# Patient Record
Sex: FEMALE | Race: WHITE | NOT HISPANIC OR LATINO | Employment: FULL TIME | URBAN - METROPOLITAN AREA
[De-identification: names, ages, dates, MRNs, and addresses within clinical notes are randomized per-mention and may not be internally consistent; named-entity substitution may affect disease eponyms.]

---

## 2019-10-18 ENCOUNTER — APPOINTMENT (EMERGENCY)
Dept: RADIOLOGY | Facility: HOSPITAL | Age: 34
End: 2019-10-18
Attending: EMERGENCY MEDICINE
Payer: COMMERCIAL

## 2019-10-18 ENCOUNTER — HOSPITAL ENCOUNTER (EMERGENCY)
Facility: HOSPITAL | Age: 34
Discharge: HOME | End: 2019-10-18
Attending: EMERGENCY MEDICINE
Payer: COMMERCIAL

## 2019-10-18 VITALS
WEIGHT: 120 LBS | HEIGHT: 65 IN | TEMPERATURE: 97.8 F | HEART RATE: 78 BPM | BODY MASS INDEX: 19.99 KG/M2 | DIASTOLIC BLOOD PRESSURE: 70 MMHG | RESPIRATION RATE: 19 BRPM | OXYGEN SATURATION: 100 % | SYSTOLIC BLOOD PRESSURE: 119 MMHG

## 2019-10-18 DIAGNOSIS — S52.502A CLOSED FRACTURE OF DISTAL END OF LEFT RADIUS, UNSPECIFIED FRACTURE MORPHOLOGY, INITIAL ENCOUNTER: Primary | ICD-10-CM

## 2019-10-18 PROCEDURE — 2W3DX1Z IMMOBILIZATION OF LEFT LOWER ARM USING SPLINT: ICD-10-PCS | Performed by: EMERGENCY MEDICINE

## 2019-10-18 PROCEDURE — 73110 X-RAY EXAM OF WRIST: CPT | Mod: LT

## 2019-10-18 PROCEDURE — 29125 APPL SHORT ARM SPLINT STATIC: CPT | Mod: LT | Performed by: PHYSICIAN ASSISTANT

## 2019-10-18 PROCEDURE — 99283 EMERGENCY DEPT VISIT LOW MDM: CPT | Mod: 25

## 2019-10-18 ASSESSMENT — ENCOUNTER SYMPTOMS
NAUSEA: 0
ABDOMINAL PAIN: 0
SHORTNESS OF BREATH: 0
WOUND: 0
WEAKNESS: 0
NUMBNESS: 0

## 2019-10-19 NOTE — ED PROVIDER NOTES
"HPI     Chief Complaint   Patient presents with   • Upper Extremity Issue       Pt is a 33 yo F with no significant past medical history presenting with chief complaint of left wrist injury. Pt reports 1 hour ago she was in her kitchen, while her son was lying on a blanket with his toys. She accidentally tripped over one of the toys causing her to fall on her buttock and sustain a FOOSH injury to her left wrist. Pt did not lose consciousness, got up without difficulty. She reports only pain to her left wrist. She is right handed. She took 800 mg motrin prior to arrival and reports pain is improving.      History provided by:  Patient       Patient History     History reviewed. No pertinent past medical history.    No past surgical history on file.    History reviewed. No pertinent family history.    Social History     Tobacco Use   • Smoking status: Never Smoker   • Smokeless tobacco: Never Used   Substance Use Topics   • Alcohol use: Yes     Comment: rare    • Drug use: Never       Systems Reviewed from Nursing Triage:          Review of Systems     Review of Systems   Respiratory: Negative for shortness of breath.    Cardiovascular: Negative for chest pain.   Gastrointestinal: Negative for abdominal pain and nausea.   Musculoskeletal: Negative for gait problem.   Skin: Negative for wound.   Neurological: Negative for weakness and numbness.        Physical Exam     ED Triage Vitals [10/18/19 2116]   Temp Heart Rate Resp BP SpO2   36.6 °C (97.8 °F) 78 19 119/70 100 %      Temp src Heart Rate Source Patient Position BP Location FiO2 (%) (Set)   -- -- -- -- --                     Patient Vitals for the past 24 hrs:   BP Temp Pulse Resp SpO2 Height Weight   10/18/19 2116 119/70 36.6 °C (97.8 °F) 78 19 100 % 1.651 m (5' 5\") 54.4 kg (120 lb)                                       Physical Exam   Constitutional: She is oriented to person, place, and time. She appears well-developed and well-nourished.  Non-toxic " appearance.   HENT:   Head: Normocephalic and atraumatic.   Eyes: Pupils are equal, round, and reactive to light. Conjunctivae and EOM are normal.   Cardiovascular:   Pulses:       Radial pulses are 2+ on the right side.   Musculoskeletal:        Left shoulder: She exhibits normal range of motion, no bony tenderness and no deformity.        Left elbow: She exhibits normal range of motion and no deformity. No tenderness found.        Left wrist: She exhibits decreased range of motion (mildly secondary to pain) and bony tenderness (at distal radius). She exhibits no tenderness (snuff box), no swelling, no deformity and no laceration.        Left hand: She exhibits normal range of motion and normal capillary refill. Normal sensation noted. Decreased sensation is not present in the ulnar distribution, is not present in the medial redistribution and is not present in the radial distribution.   Neurological: She is alert and oriented to person, place, and time.   Skin: Skin is warm and dry.   Nursing note and vitals reviewed.           Splint Application  Date/Time: 10/18/2019 10:33 PM  Performed by: Theresa Castillo PA C  Authorized by: Todd Thakur MD     Consent:     Consent obtained:  Verbal    Consent given by:  Patient    Risks discussed:  Nerve damage, pain and vascular damage  Injury:     Injury location:  Wrist    Wrist injury location:  L wrist    Wrist fracture type comment:  Distal radius  Pre-procedure assessment:     Neurological function: normal      Distal perfusion: normal      Range of motion: reduced      Range of motion comment:  Secondary to pain  Anesthesia (see MAR for exact dosages):     Anesthesia method:  None  Procedure details:     Manipulation performed: no      Immobilization:  Splint    Splint type:  Thumb spica    Supplies used:  Ortho-Glass and cotton padding (ACE)  Post-procedure assessment:     Neurological function: normal      Distal perfusion: normal      Range of motion comment:   Reduced secondary to immobilization    Patient tolerance of procedure:  Tolerated well, no immediate complications        ED Course & [facility]     Labs Reviewed - No data to display    X-RAY WRIST LEFT 3+ VIEWS   ED Interpretation   Distal radius frx   As interpreted by me, Todd Thakur M.D.   Please refer to final result as interpreted by radiologist for complete detailed description/findings of the study.                  [facility]         ED Course as of Oct 18 2236   Fri Oct 18, 2019   2209 X-ray reviewed with Dr. Thakur, Concern for fracture on distal radius, will put in thumb spica. D/c with ortho f/u within 1 week. Discussed reasons to return.    [KM]      ED Course User Index  [KM] Theresa Castillo PA C         Clinical Impressions as of Oct 18 2236   Closed fracture of distal end of left radius, unspecified fracture morphology, initial encounter      Dispo:  Discharge     Theresa Castillo PA C  10/18/19 2234       Theresa Castillo PA C  10/18/19 2236

## 2019-10-19 NOTE — ED ATTESTATION NOTE
I have personally seen, evaluated,and participated in the management of Joy Tai.  I reviewed and agree with the PA/NP's assessment and plan of care with the following exceptions: None    My examination, assessment, and plan of care for Joy Tai:  34 rt handed female p/w left wrist pain after mechanical slip and fall backwards  Exam:  Tender ecchymotic swelling to distal radiua/snuff box.  Intact distal cap refills, nv exams.  nontender hand, elbow, shoulder  Plan:  xr     Todd Thakur MD  10/18/19 0124

## 2019-10-19 NOTE — DISCHARGE INSTRUCTIONS
Keep the splint on. Do not get it wet.  Elevate & ice the arm to decrease swelling.    Motrin 600 -800 mg with food every 6-8 hours as needed for pain.    Call the orthopedist to schedule follow-up within 1 week.    Return to the ER IMMEDIATELY if you experience worsening of symptoms/ uncontrollable pain, numbness/tingling/ decreased range of motion or color change to your fingers, or develop any other new concerns.

## 2019-11-16 ENCOUNTER — HOSPITAL ENCOUNTER (EMERGENCY)
Facility: HOSPITAL | Age: 34
Discharge: HOME | End: 2019-11-16
Attending: EMERGENCY MEDICINE
Payer: COMMERCIAL

## 2019-11-16 VITALS
SYSTOLIC BLOOD PRESSURE: 125 MMHG | WEIGHT: 120 LBS | HEIGHT: 65 IN | RESPIRATION RATE: 18 BRPM | OXYGEN SATURATION: 99 % | DIASTOLIC BLOOD PRESSURE: 72 MMHG | TEMPERATURE: 97.5 F | HEART RATE: 86 BPM | BODY MASS INDEX: 19.99 KG/M2

## 2019-11-16 DIAGNOSIS — L02.411 ABSCESS OF AXILLA, RIGHT: Primary | ICD-10-CM

## 2019-11-16 PROCEDURE — 0H9BXZZ DRAINAGE OF RIGHT UPPER ARM SKIN, EXTERNAL APPROACH: ICD-10-PCS | Performed by: EMERGENCY MEDICINE

## 2019-11-16 PROCEDURE — 10060 I&D ABSCESS SIMPLE/SINGLE: CPT | Performed by: PHYSICIAN ASSISTANT

## 2019-11-16 PROCEDURE — 99282 EMERGENCY DEPT VISIT SF MDM: CPT | Mod: 25

## 2019-11-16 RX ORDER — OXYCODONE AND ACETAMINOPHEN 5; 325 MG/1; MG/1
1 TABLET ORAL EVERY 6 HOURS PRN
Qty: 10 TABLET | Refills: 0 | Status: SHIPPED | OUTPATIENT
Start: 2019-11-16 | End: 2020-06-27 | Stop reason: ALTCHOICE

## 2019-11-16 SDOH — HEALTH STABILITY: MENTAL HEALTH: HOW OFTEN DO YOU HAVE A DRINK CONTAINING ALCOHOL?: NEVER

## 2019-11-16 ASSESSMENT — ENCOUNTER SYMPTOMS
CHEST TIGHTNESS: 0
WOUND: 0
SHORTNESS OF BREATH: 0
FEVER: 0
CHILLS: 0
LIGHT-HEADEDNESS: 0
NAUSEA: 0
VOMITING: 0
DIZZINESS: 0
HEADACHES: 0

## 2019-11-16 NOTE — DISCHARGE INSTRUCTIONS
Please call and follow up with your primary care DrSophia In about 48 hours for evaluation and removal of the packing. Please make sure if you need to take the stronger antibiotics that you pump and dump the breast milk for 24 hours after taking the medication. Return here if your symptoms change, get worse or if you have any other concerns.

## 2019-11-16 NOTE — ED ATTESTATION NOTE
I have personally seen and examined the patient.  I reviewed and agree with physician assistant / nurse practitioner’s assessment and plan of care, with the following exceptions: None  My examination, assessment, and plan of care of Joy Tai is as follows:     Exam: left axilla:  3 separate abscesses, largest is 1x2 cm     Dalton Blanc MD  11/16/19 6114

## 2019-11-16 NOTE — ED PROVIDER NOTES
HPI     Chief Complaint   Patient presents with   • Abscess     Patient is a 34-year-old female with no significant past medical history that is presenting to the emergency room due to 3 abscesses in her right armpit area.  Patient tells me about a week ago noticed some redness and swelling to 3 different areas of her armpit they got progressively worse she was evaluated for this and was given Bactrim which she has been taking for the last around 5 days.  Patient reports that it continues to get worse so came to emergency room for evaluation.      History provided by:  Patient  Abscess   Location:  Shoulder/arm  Shoulder/arm abscess location:  R axilla  Size:  3 different ones, about 2cm, 0.5cm and 1cm  Abscess quality: induration, painful and redness    Red streaking: no    Duration:  7 days  Progression:  Worsening  Pain details:     Quality:  Pressure and sharp    Severity:  Mild    Duration:  7 days    Timing:  Constant    Progression:  Worsening  Chronicity:  New  Worsened by:  Nothing  Ineffective treatments:  None tried  Associated symptoms: no fever, no headaches, no nausea and no vomiting         Patient History     History reviewed. No pertinent past medical history.    Past Surgical History:   Procedure Laterality Date   • APPENDECTOMY         History reviewed. No pertinent family history.    Social History     Tobacco Use   • Smoking status: Never Smoker   • Smokeless tobacco: Never Used   Substance Use Topics   • Alcohol use: Not Currently     Frequency: Never   • Drug use: Never       Systems Reviewed from Nursing Triage:          Review of Systems     Review of Systems   Constitutional: Negative for chills and fever.   Respiratory: Negative for chest tightness and shortness of breath.    Cardiovascular: Negative for chest pain.   Gastrointestinal: Negative for nausea and vomiting.   Skin: Negative for rash and wound.   Neurological: Negative for dizziness, light-headedness and headaches.         "Physical Exam     ED Triage Vitals [11/16/19 1153]   Temp Heart Rate Resp BP SpO2   36.4 °C (97.5 °F) 86 18 125/72 99 %      Temp Source Heart Rate Source Patient Position BP Location FiO2 (%) (Set)   Tympanic -- -- -- --                     Patient Vitals for the past 24 hrs:   BP Temp Temp src Pulse Resp SpO2 Height Weight   11/16/19 1153 125/72 36.4 °C (97.5 °F) Tympanic 86 18 99 % 1.651 m (5' 5\") 54.4 kg (120 lb)                                       Physical Exam   Constitutional: She is oriented to person, place, and time. She appears well-developed and well-nourished.   Neurological: She is alert and oriented to person, place, and time.   Skin: Skin is warm and dry.   Patient has 3 abscesses underneath her right armpit the first 1 is a little bit farther down on her upper arm about 2 cm in diameter the one directly centered in her armpit is less than a half a centimeter and the 1 a little bit down to her chest wall is around 1 cm diameter.  All 3 seem to be fairly fluctuant.   Nursing note and vitals reviewed.           Incision and Drainage  Date/Time: 11/16/2019 1:14 PM  Performed by: Dominic Hamilton PA C  Authorized by: Dalton Blanc MD     Consent:     Consent obtained:  Verbal    Consent given by:  Patient  Location:     Type:  Abscess    Size:  2cm    Location:  Upper extremity    Upper extremity location:  Arm    Arm location:  R upper arm  Pre-procedure details:     Skin preparation:  Betadine  Anesthesia (see MAR for exact dosages):     Anesthesia method:  Local infiltration    Local anesthetic:  Lidocaine 2% WITH epi  Procedure type:     Complexity:  Simple  Procedure details:     Needle aspiration: no      Incision types:  Single straight    Incision depth:  Dermal    Scalpel blade:  11    Wound management:  Probed and deloculated and irrigated with saline    Drainage:  Purulent    Drainage amount:  Moderate    Wound treatment:  Wound left open    Packing materials:  1/4 in " gauze  Post-procedure details:     Patient tolerance of procedure:  Tolerated well, no immediate complications  Incision and Drainage  Date/Time: 11/16/2019 1:15 PM  Performed by: Dominic Hamilton PA C  Authorized by: Dalton Blanc MD     Consent:     Consent obtained:  Verbal    Consent given by:  Patient  Location:     Type:  Abscess    Size:  0.5cm    Location:  Upper extremity    Upper extremity location:  Arm    Arm location:  R upper arm  Pre-procedure details:     Skin preparation:  Betadine  Anesthesia (see MAR for exact dosages):     Anesthesia method:  Local infiltration    Local anesthetic:  Lidocaine 2% WITH epi  Procedure type:     Complexity:  Simple  Procedure details:     Needle aspiration: no      Incision types:  Single straight    Incision depth:  Dermal    Scalpel blade:  11    Wound management:  Probed and deloculated and irrigated with saline    Drainage:  Purulent    Drainage amount:  Moderate    Wound treatment:  Wound left open    Packing materials:  None  Post-procedure details:     Patient tolerance of procedure:  Tolerated well, no immediate complications  Incision and Drainage  Date/Time: 11/16/2019 1:16 PM  Performed by: Dominic Hamilton PA C  Authorized by: Dalton Blanc MD     Consent:     Consent obtained:  Verbal    Consent given by:  Patient  Location:     Type:  Abscess    Size:  1    Location:  Upper extremity    Upper extremity location:  Arm    Arm location:  R upper arm  Pre-procedure details:     Skin preparation:  Betadine  Anesthesia (see MAR for exact dosages):     Anesthesia method:  Local infiltration    Local anesthetic:  Lidocaine 2% WITH epi  Procedure type:     Complexity:  Simple  Procedure details:     Needle aspiration: no      Incision types:  Single straight    Incision depth:  Dermal    Scalpel blade:  11    Wound management:  Irrigated with saline and probed and deloculated    Drainage:  Purulent    Drainage amount:  Moderate    Wound treatment:   Wound left open    Packing materials:  1/4 in gauze  Post-procedure details:     Patient tolerance of procedure:  Tolerated well, no immediate complications        ED Course & MDM     Labs Reviewed - No data to display    No orders to display               MDM         Clinical Impressions as of Nov 16 1321   Abscess of axilla, right        Dominic Hamilton PA C  11/16/19 1322

## 2020-06-27 ENCOUNTER — HOSPITAL ENCOUNTER (EMERGENCY)
Facility: HOSPITAL | Age: 35
Discharge: HOME | End: 2020-06-27
Attending: EMERGENCY MEDICINE
Payer: COMMERCIAL

## 2020-06-27 VITALS
BODY MASS INDEX: 19.99 KG/M2 | DIASTOLIC BLOOD PRESSURE: 70 MMHG | WEIGHT: 120 LBS | HEIGHT: 65 IN | HEART RATE: 78 BPM | OXYGEN SATURATION: 100 % | RESPIRATION RATE: 18 BRPM | TEMPERATURE: 97.9 F | SYSTOLIC BLOOD PRESSURE: 132 MMHG

## 2020-06-27 DIAGNOSIS — L03.211 CELLULITIS OF FACE: Primary | ICD-10-CM

## 2020-06-27 PROCEDURE — 99281 EMR DPT VST MAYX REQ PHY/QHP: CPT

## 2020-06-27 ASSESSMENT — ENCOUNTER SYMPTOMS
FEVER: 0
NECK STIFFNESS: 0
FACIAL SWELLING: 1
NECK PAIN: 0
TROUBLE SWALLOWING: 0
SHORTNESS OF BREATH: 0
CHILLS: 0

## 2020-06-27 NOTE — ED ATTESTATION NOTE
I have personally seen and examined the patient.  I reviewed and agree with physician assistant / nurse practitioner’s assessment and plan of care, with the following exceptions: None  My examination, assessment, and plan of care of Joy Tai is as follows:       She is complaining of swelling of her chin.  States been there for several days.  She on doxycycline 2 days.  She has been warm compresses she is concerned because she read online that she get meningitis from an infection on her face so she came to emergency department.  The patient has a proxy 1 cm carbuncle on her chin area.  There is nothing submental region.  There is minimal surrounding erythema there is a small pustule noted.  I feel that doxycycline is the appropriate drug for this.  She is told to use warm compresses 20 minutes at least 6 times a day and this will help with the effervescence of her pimple.  She told to follow-up with her primary care doctor for any other issues     Jame Motta, DO  06/27/20 152

## 2020-06-27 NOTE — ED PROVIDER NOTES
"HPI     Chief Complaint   Patient presents with   • Mass       36 y/o F presents to the ER with infection to chin. 5 days ago patient noticed a pimple to her chin. She popped it and it started to get swollen and drain a little bit of fluid. She was started on doxycycline 3 days ago but she is afraid it isnt getting better so came to ER. She said she was reading online about how facial abscesses can lead to meningitis and encephalitis and was worried so she wanted to get it checked out. She said it hasnt worsened and that her  thought it actually looked less swollen. The area of redness hasnt spread. No fevers or chills or any other symptom.            Patient History     History reviewed. No pertinent past medical history.    Past Surgical History:   Procedure Laterality Date   • APPENDECTOMY         History reviewed. No pertinent family history.    Social History     Tobacco Use   • Smoking status: Never Smoker   • Smokeless tobacco: Never Used   Substance Use Topics   • Alcohol use: Not Currently     Frequency: Never   • Drug use: Never       Systems Reviewed from Nursing Triage:          Review of Systems     Review of Systems   Constitutional: Negative for chills and fever.   HENT: Positive for facial swelling. Negative for dental problem and trouble swallowing.    Respiratory: Negative for shortness of breath.    Musculoskeletal: Negative for neck pain and neck stiffness.        Physical Exam     ED Triage Vitals [06/27/20 1501]   Temp Heart Rate Resp BP SpO2   36.6 °C (97.9 °F) 78 18 132/70 100 %      Temp Source Heart Rate Source Patient Position BP Location FiO2 (%) (Set)   Tympanic -- -- -- --                     Patient Vitals for the past 24 hrs:   BP Temp Temp src Pulse Resp SpO2 Height Weight   06/27/20 1501 132/70 36.6 °C (97.9 °F) Tympanic 78 18 100 % 1.651 m (5' 5\") 54.4 kg (120 lb)                                          Physical Exam   Constitutional: She appears well-developed and " well-nourished. No distress.   HENT:   1 cm carbuncle to chin with some slight surrounding erythema. No active drainage. NO fluctuance or collection to pus. No submental or submandibular swelling or fullness   Neck: Normal range of motion. Neck supple.   Lymphadenopathy:     She has no cervical adenopathy.   Skin: She is not diaphoretic.            Procedures    Labs Reviewed - No data to display    No orders to display               ED Course & MDM     MDM         ED Course as of Jun 27 1701   Sat Jun 27, 2020 1701 Pt with small infected pimple. Already on oral abx. No collection to I&D. Will have her continue antibiotics and encourage warm comprseses     [AC]      ED Course User Index  [AC] Joy Obregon PA C         Clinical Impressions as of Jun 27 1701   Cellulitis of face        Joy Obregon PA C  06/27/20 1701

## 2020-06-27 NOTE — DISCHARGE INSTRUCTIONS
Apply warm compresses to the area for 20 minutes every few hours. Continue taking Doxycycline. You may apply topical antibiotic to area as well. Return if worsening redness, fevers or chills

## 2021-12-10 ENCOUNTER — TRANSCRIBE ORDERS (OUTPATIENT)
Dept: SCHEDULING | Age: 36
End: 2021-12-10
Payer: COMMERCIAL

## 2021-12-10 DIAGNOSIS — O35.9XX0 MATERNAL CARE FOR (SUSPECTED) FETAL ABNORMALITY AND DAMAGE, UNSPECIFIED, NOT APPLICABLE OR UNSPECIFIED: Primary | ICD-10-CM

## 2021-12-10 DIAGNOSIS — O09.529 SUPERVISION OF ELDERLY MULTIGRAVIDA, UNSPECIFIED TRIMESTER: ICD-10-CM

## 2021-12-13 ENCOUNTER — TELEPHONE (OUTPATIENT)
Dept: ADMISSIONS | Facility: HOSPITAL | Age: 36
End: 2021-12-13
Payer: COMMERCIAL

## 2021-12-13 NOTE — NURSING NOTE
Sent message on teams to Noy Jacobo and Chantal Goss:  Pt is referred by her OBGYN Dr. Lauren Oppenheim at [facility].  Pt is 10 weeks pregnant.  She is experiencing a lot of anxiety.   feels she would benefit from individual therapy.  Discussed [facility] group with pt -- she may be interested -- will think about it and wait for my return call.  Do we have any availability for individual therapy?  Pt has KHPE no SI/HI/HANS.    [Yesterday 1:53 PM] Ximena Jacobo, just checked our schedules and unfortunately, we do not have room currently for individual therapy at this time.  Can you offer PIP and outside referrals?

## 2021-12-14 LAB
HBV SURFACE AG SER QL: NONREACTIVE
HIV 1+2 AB+HIV1 P24 AG SERPL QL IA: NONREACTIVE
QST CHLAMYDIA TRACHOMATIS RNA, TMA: NEGATIVE
QST NEISSERIA GONORRHOEAE RNA, TMA: NEGATIVE
RPR SER QL: NORMAL
RUBELLA IGG SCREEN: NORMAL

## 2021-12-14 NOTE — TELEPHONE ENCOUNTER
12/14/21    Left  for pt to discuss outside referrals for individual therapy and to see if she would like to schedule a group eval.

## 2022-01-27 ENCOUNTER — HOSPITAL ENCOUNTER (OUTPATIENT)
Dept: PERINATAL CARE | Facility: HOSPITAL | Age: 37
Discharge: HOME | End: 2022-01-27
Attending: NURSE PRACTITIONER
Payer: COMMERCIAL

## 2022-01-27 DIAGNOSIS — Z36.3 ENCOUNTER FOR ANTENATAL SCREENING FOR MALFORMATION: ICD-10-CM

## 2022-01-27 DIAGNOSIS — Z3A.16 16 WEEKS GESTATION OF PREGNANCY: ICD-10-CM

## 2022-01-27 DIAGNOSIS — O98.512 COVID-19 AFFECTING PREGNANCY IN SECOND TRIMESTER: ICD-10-CM

## 2022-01-27 DIAGNOSIS — O09.92 HIGH-RISK PREGNANCY IN SECOND TRIMESTER: ICD-10-CM

## 2022-01-27 DIAGNOSIS — O09.522 ELDERLY MULTIGRAVIDA, SECOND TRIMESTER: Primary | ICD-10-CM

## 2022-01-27 DIAGNOSIS — U07.1 COVID-19 AFFECTING PREGNANCY IN SECOND TRIMESTER: ICD-10-CM

## 2022-01-27 PROCEDURE — 76805 OB US >/= 14 WKS SNGL FETUS: CPT

## 2022-02-21 ENCOUNTER — HOSPITAL ENCOUNTER (OUTPATIENT)
Dept: PERINATAL CARE | Facility: HOSPITAL | Age: 37
Discharge: HOME | End: 2022-02-21
Attending: NURSE PRACTITIONER
Payer: COMMERCIAL

## 2022-02-21 DIAGNOSIS — O09.92 SUPERVISION OF HIGH RISK PREGNANCY IN SECOND TRIMESTER: Primary | ICD-10-CM

## 2022-02-21 DIAGNOSIS — Z3A.20 20 WEEKS GESTATION OF PREGNANCY: ICD-10-CM

## 2022-02-21 DIAGNOSIS — Z36.3 ANTENATAL SCREENING FOR MALFORMATION USING ULTRASONICS: ICD-10-CM

## 2022-02-21 DIAGNOSIS — O35.9XX0 SUSPECTED FETAL ABNORMALITY AFFECTING MANAGEMENT OF MOTHER, ANTEPARTUM, SINGLE OR UNSPECIFIED FETUS: ICD-10-CM

## 2022-02-21 DIAGNOSIS — O09.522 AMA (ADVANCED MATERNAL AGE) MULTIGRAVIDA 35+, SECOND TRIMESTER: ICD-10-CM

## 2022-02-21 PROCEDURE — 76811 OB US DETAILED SNGL FETUS: CPT

## 2022-03-21 ENCOUNTER — TRANSCRIBE ORDERS (OUTPATIENT)
Dept: SCHEDULING | Age: 37
End: 2022-03-21

## 2022-03-21 DIAGNOSIS — O09.512 SUPERVISION OF ELDERLY PRIMIGRAVIDA, SECOND TRIMESTER: Primary | ICD-10-CM

## 2022-05-16 ENCOUNTER — HOSPITAL ENCOUNTER (OUTPATIENT)
Dept: PERINATAL CARE | Facility: HOSPITAL | Age: 37
Discharge: HOME | End: 2022-05-16
Attending: NURSE PRACTITIONER
Payer: COMMERCIAL

## 2022-05-16 DIAGNOSIS — Z3A.32 32 WEEKS GESTATION OF PREGNANCY: ICD-10-CM

## 2022-05-16 DIAGNOSIS — O09.523 AMA (ADVANCED MATERNAL AGE) MULTIGRAVIDA 35+, THIRD TRIMESTER: ICD-10-CM

## 2022-05-16 DIAGNOSIS — O09.93 SUPERVISION OF HIGH RISK PREGNANCY IN THIRD TRIMESTER: Primary | ICD-10-CM

## 2022-05-16 PROCEDURE — 76816 OB US FOLLOW-UP PER FETUS: CPT

## 2022-06-13 ENCOUNTER — HOSPITAL ENCOUNTER (OUTPATIENT)
Facility: HOSPITAL | Age: 37
Discharge: HOME | End: 2022-06-13
Attending: OBSTETRICS & GYNECOLOGY | Admitting: OBSTETRICS & GYNECOLOGY
Payer: COMMERCIAL

## 2022-06-13 VITALS
DIASTOLIC BLOOD PRESSURE: 59 MMHG | HEIGHT: 65 IN | WEIGHT: 140 LBS | TEMPERATURE: 98.6 F | SYSTOLIC BLOOD PRESSURE: 100 MMHG | HEART RATE: 82 BPM | BODY MASS INDEX: 23.32 KG/M2 | RESPIRATION RATE: 18 BRPM

## 2022-06-13 RX ORDER — ASPIRIN 81 MG/1
81 TABLET ORAL DAILY
COMMUNITY
End: 2022-07-03 | Stop reason: HOSPADM

## 2022-06-13 ASSESSMENT — PATIENT HEALTH QUESTIONNAIRE - PHQ9: SUM OF ALL RESPONSES TO PHQ9 QUESTIONS 1 & 2: 0

## 2022-06-13 NOTE — H&P
HPI     Joy Tai is a 37 y.o. female  at 36w2d with an estimated due date of 2022, by Last Menstrual Period who presents with decreased fetal movement    Last PO intake:  Last Food Intake Date: 22, Last Food Intake Time: 1200  Last Fluid Intake Date: 22, Last Fluid Intake Time: 1200    OB History:   OB History    Para Term  AB Living   2 1 1 0 0 1   SAB IAB Ectopic Multiple Live Births   0 0 0 0 0      # Outcome Date GA Lbr Senthil/2nd Weight Sex Delivery Anes PTL Lv   2 Current            1 Term 19    M Vag-Spont          Medical History:   Past Medical History:   Diagnosis Date   • Anxiety        Surgical History:   Past Surgical History:   Procedure Laterality Date   • APPENDECTOMY         Social History:   Social History     Socioeconomic History   • Marital status:      Spouse name: None   • Number of children: 1   • Years of education: None   • Highest education level: None   Occupational History   • 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 No pertinent family history.    Allergies: Patient has no known allergies.    Prior to Admission medications    Medication Sig Start Date End Date Taking? Authorizing Provider   aspirin 81 mg enteric coated tablet Take 81 mg by mouth daily.   Yes True Liang MD   prenatal vit no.130-iron-folic 27 mg iron- 800 mcg tablet tablet Take 1 tablet by mouth daily.   Yes True Liang MD       Review of Systems  Pertinent items are noted in HPI.    Objective     Vital Signs for the last 24 hours:  Temp:  [37 °C (98.6 °F)] 37 °C (98.6 °F)  Heart Rate:  [82] 82  Resp:  [18] 18  BP: (100)/(59) 100/59    Latest cervical exam:                    Fetal  Monitoring:  FHR Baseline: 140  FHR Variability: moderate  FHR Accelerations: yes  FHR Decelerations: no    Contraction Frequency: no    Exam:  General appearance: alert, appears stated age and cooperative  Abdomen: soft, non-tender; bowel sounds normal; no masses, no organomegaly    Ultrasounds:   Not applicable      Labs:  No results found for: ABO, LABRH, RUBELLAIGGQT, GBS    Assessment/Plan     Joy Tai is a 37 y.o. female  at 36w2d admitted for observation for decreased fetal movement    FHR: Category I  Discharge home with [facility]    Ad Andrew MD

## 2022-06-16 LAB — GP B STREP SPEC QL CULT: NO GROWTH

## 2022-07-01 ENCOUNTER — HOSPITAL ENCOUNTER (OUTPATIENT)
Facility: HOSPITAL | Age: 37
Discharge: HOME | End: 2022-07-01
Attending: OBSTETRICS & GYNECOLOGY | Admitting: OBSTETRICS & GYNECOLOGY
Payer: COMMERCIAL

## 2022-07-01 ENCOUNTER — ANESTHESIA (INPATIENT)
Dept: OBSTETRICS AND GYNECOLOGY | Facility: HOSPITAL | Age: 37
End: 2022-07-01
Payer: COMMERCIAL

## 2022-07-01 ENCOUNTER — HOSPITAL ENCOUNTER (INPATIENT)
Facility: HOSPITAL | Age: 37
LOS: 2 days | Discharge: HOME | End: 2022-07-03
Attending: OBSTETRICS & GYNECOLOGY | Admitting: OBSTETRICS & GYNECOLOGY
Payer: COMMERCIAL

## 2022-07-01 ENCOUNTER — ANESTHESIA EVENT (INPATIENT)
Dept: OBSTETRICS AND GYNECOLOGY | Facility: HOSPITAL | Age: 37
End: 2022-07-01
Payer: COMMERCIAL

## 2022-07-01 VITALS
RESPIRATION RATE: 16 BRPM | BODY MASS INDEX: 23.82 KG/M2 | OXYGEN SATURATION: 99 % | TEMPERATURE: 98.3 F | SYSTOLIC BLOOD PRESSURE: 116 MMHG | DIASTOLIC BLOOD PRESSURE: 69 MMHG | HEIGHT: 65 IN | HEART RATE: 84 BPM | WEIGHT: 143 LBS

## 2022-07-01 PROBLEM — Z3A.38 38 WEEKS GESTATION OF PREGNANCY: Status: ACTIVE | Noted: 2022-07-01

## 2022-07-01 PROBLEM — Z34.90 TERM PREGNANCY: Status: ACTIVE | Noted: 2022-07-01

## 2022-07-01 LAB
ABO + RH BLD: NORMAL
BLD GP AB SCN SERPL QL: NEGATIVE
D AG BLD QL: POSITIVE
ERYTHROCYTE [DISTWIDTH] IN BLOOD BY AUTOMATED COUNT: 12.7 % (ref 11.7–14.4)
HBV SURFACE AG SER QL: NONREACTIVE
HCT VFR BLDCO AUTO: 33.2 % (ref 35–45)
HGB BLD-MCNC: 11.3 G/DL (ref 11.8–15.7)
LABORATORY COMMENT REPORT: NORMAL
MCH RBC QN AUTO: 31 PG (ref 28–33.2)
MCHC RBC AUTO-ENTMCNC: 34 G/DL (ref 32.2–35.5)
MCV RBC AUTO: 91.2 FL (ref 83–98)
PDW BLD AUTO: 9.9 FL (ref 9.4–12.3)
PLATELET # BLD AUTO: 252 K/UL (ref 150–369)
RBC # BLD AUTO: 3.64 M/UL (ref 3.93–5.22)
SARS-COV-2 RNA RESP QL NAA+PROBE: NEGATIVE
SPECIMEN EXP DATE BLD: NORMAL
T PALLIDUM AB SER QL IF: NONREACTIVE
WBC # BLD AUTO: 17.27 K/UL (ref 3.8–10.5)

## 2022-07-01 PROCEDURE — 27200130 HC EPIDURAL ANES TRAY

## 2022-07-01 PROCEDURE — 63600000 HC DRUGS/DETAIL CODE: Performed by: ANESTHESIOLOGY

## 2022-07-01 PROCEDURE — 25000000 HC PHARMACY GENERAL

## 2022-07-01 PROCEDURE — 86780 TREPONEMA PALLIDUM: CPT | Performed by: OBSTETRICS & GYNECOLOGY

## 2022-07-01 PROCEDURE — 37000005 HC ANESTHESIA EPIDURAL/SPINAL

## 2022-07-01 PROCEDURE — 25800000 HC PHARMACY IV SOLUTIONS: Performed by: ANESTHESIOLOGY

## 2022-07-01 PROCEDURE — 87340 HEPATITIS B SURFACE AG IA: CPT | Performed by: OBSTETRICS & GYNECOLOGY

## 2022-07-01 PROCEDURE — 86901 BLOOD TYPING SEROLOGIC RH(D): CPT

## 2022-07-01 PROCEDURE — 63600000 HC DRUGS/DETAIL CODE: Performed by: OBSTETRICS & GYNECOLOGY

## 2022-07-01 PROCEDURE — 25000000 HC PHARMACY GENERAL: Performed by: ANESTHESIOLOGY

## 2022-07-01 PROCEDURE — 0KQM0ZZ REPAIR PERINEUM MUSCLE, OPEN APPROACH: ICD-10-PCS | Performed by: OBSTETRICS & GYNECOLOGY

## 2022-07-01 PROCEDURE — U0002 COVID-19 LAB TEST NON-CDC: HCPCS | Performed by: OBSTETRICS & GYNECOLOGY

## 2022-07-01 PROCEDURE — 72000011 HC VAGINAL DELIVERY LEVEL 1

## 2022-07-01 PROCEDURE — 12000000 HC ROOM AND CARE MED/SURG

## 2022-07-01 PROCEDURE — 85027 COMPLETE CBC AUTOMATED: CPT | Performed by: OBSTETRICS & GYNECOLOGY

## 2022-07-01 PROCEDURE — 36415 COLL VENOUS BLD VENIPUNCTURE: CPT | Performed by: OBSTETRICS & GYNECOLOGY

## 2022-07-01 RX ORDER — OXYTOCIN/0.9 % SODIUM CHLORIDE 40/1000ML
500 PLASTIC BAG, INJECTION (ML) INTRAVENOUS ONCE
Status: CANCELLED | OUTPATIENT
Start: 2022-07-01 | End: 2022-07-01

## 2022-07-01 RX ORDER — CARBOPROST TROMETHAMINE 250 UG/ML
250 INJECTION, SOLUTION INTRAMUSCULAR ONCE AS NEEDED
Status: CANCELLED | OUTPATIENT
Start: 2022-07-01

## 2022-07-01 RX ORDER — TRANEXAMIC ACID 10 MG/ML
1000 INJECTION, SOLUTION INTRAVENOUS ONCE AS NEEDED
Status: CANCELLED | OUTPATIENT
Start: 2022-07-01

## 2022-07-01 RX ORDER — OXYTOCIN/0.9 % SODIUM CHLORIDE 40/1000ML
PLASTIC BAG, INJECTION (ML) INTRAVENOUS CONTINUOUS
Status: DISCONTINUED | OUTPATIENT
Start: 2022-07-02 | End: 2022-07-02

## 2022-07-01 RX ORDER — FENTANYL/ROPIVACAINE/NS/PF 2-1500 MCG
PREFILLED PUMP RESERVOIR INJECTION CONTINUOUS
Status: DISCONTINUED | OUTPATIENT
Start: 2022-07-01 | End: 2022-07-02

## 2022-07-01 RX ORDER — METHYLERGONOVINE MALEATE 0.2 MG/ML
200 INJECTION INTRAVENOUS ONCE AS NEEDED
Status: CANCELLED | OUTPATIENT
Start: 2022-07-01

## 2022-07-01 RX ORDER — OXYTOCIN/0.9 % SODIUM CHLORIDE 40/1000ML
500 PLASTIC BAG, INJECTION (ML) INTRAVENOUS ONCE
Status: COMPLETED | OUTPATIENT
Start: 2022-07-01 | End: 2022-07-01

## 2022-07-01 RX ORDER — LIDOCAINE HYDROCHLORIDE AND EPINEPHRINE 15; 5 MG/ML; UG/ML
INJECTION, SOLUTION EPIDURAL
Status: COMPLETED | OUTPATIENT
Start: 2022-07-01 | End: 2022-07-01

## 2022-07-01 RX ORDER — LIDOCAINE HYDROCHLORIDE 10 MG/ML
0-30 INJECTION, SOLUTION EPIDURAL; INFILTRATION; INTRACAUDAL; PERINEURAL ONCE AS NEEDED
Status: CANCELLED | OUTPATIENT
Start: 2022-07-01

## 2022-07-01 RX ORDER — OXYTOCIN 10 [USP'U]/ML
10 INJECTION, SOLUTION INTRAMUSCULAR; INTRAVENOUS ONCE AS NEEDED
Status: CANCELLED | OUTPATIENT
Start: 2022-07-01

## 2022-07-01 RX ORDER — MISOPROSTOL 200 UG/1
1000 TABLET ORAL ONCE AS NEEDED
Status: CANCELLED | OUTPATIENT
Start: 2022-07-01

## 2022-07-01 RX ORDER — SODIUM CHLORIDE, SODIUM LACTATE, POTASSIUM CHLORIDE, CALCIUM CHLORIDE 600; 310; 30; 20 MG/100ML; MG/100ML; MG/100ML; MG/100ML
125 INJECTION, SOLUTION INTRAVENOUS CONTINUOUS
Status: DISCONTINUED | OUTPATIENT
Start: 2022-07-01 | End: 2022-07-01 | Stop reason: HOSPADM

## 2022-07-01 RX ORDER — OXYTOCIN/0.9 % SODIUM CHLORIDE 40/1000ML
PLASTIC BAG, INJECTION (ML) INTRAVENOUS
Status: COMPLETED
Start: 2022-07-01 | End: 2022-07-01

## 2022-07-01 RX ORDER — SODIUM CHLORIDE, SODIUM LACTATE, POTASSIUM CHLORIDE, CALCIUM CHLORIDE 600; 310; 30; 20 MG/100ML; MG/100ML; MG/100ML; MG/100ML
125 INJECTION, SOLUTION INTRAVENOUS CONTINUOUS
Status: DISCONTINUED | OUTPATIENT
Start: 2022-07-01 | End: 2022-07-02

## 2022-07-01 RX ORDER — OXYTOCIN/0.9 % SODIUM CHLORIDE 40/1000ML
PLASTIC BAG, INJECTION (ML) INTRAVENOUS CONTINUOUS
Status: CANCELLED | OUTPATIENT
Start: 2022-07-01 | End: 2022-07-01

## 2022-07-01 RX ADMIN — FENTANYL CITRATE 50 ML: 0.05 INJECTION, SOLUTION INTRAMUSCULAR; INTRAVENOUS at 21:35

## 2022-07-01 RX ADMIN — FENTANYL CITRATE 50 ML: 0.05 INJECTION, SOLUTION INTRAMUSCULAR; INTRAVENOUS at 18:40

## 2022-07-01 RX ADMIN — Medication 40 UNITS: at 23:50

## 2022-07-01 RX ADMIN — SODIUM CHLORIDE, POTASSIUM CHLORIDE, SODIUM LACTATE AND CALCIUM CHLORIDE 125 ML/HR: 600; 310; 30; 20 INJECTION, SOLUTION INTRAVENOUS at 04:49

## 2022-07-01 RX ADMIN — FENTANYL CITRATE 10 ML: 0.05 INJECTION, SOLUTION INTRAMUSCULAR; INTRAVENOUS at 18:51

## 2022-07-01 RX ADMIN — LIDOCAINE HYDROCHLORIDE,EPINEPHRINE BITARTRATE 3 ML: 15; .005 INJECTION, SOLUTION EPIDURAL; INFILTRATION; INTRACAUDAL; PERINEURAL at 18:46

## 2022-07-01 RX ADMIN — SODIUM CHLORIDE, POTASSIUM CHLORIDE, SODIUM LACTATE AND CALCIUM CHLORIDE 125 ML/HR: 600; 310; 30; 20 INJECTION, SOLUTION INTRAVENOUS at 18:55

## 2022-07-01 ASSESSMENT — PATIENT HEALTH QUESTIONNAIRE - PHQ9: SUM OF ALL RESPONSES TO PHQ9 QUESTIONS 1 & 2: 0

## 2022-07-01 NOTE — PROGRESS NOTES
Somewhat uncomfortable, not needing pain meds yet  TOCO- irregular  FHT - cat I  Cervix 3/80/-2  Will allow ambulate  recheck in 2 hours

## 2022-07-01 NOTE — H&P
HPI     Joy Tai is a 37 y.o. female  at 38w6d with an estimated due date of 2022, by Last Menstrual Period who presents with painful contraction and bloody show.  She was sent home this morning. She reports contraction getting worse.    Last PO intake:   ,     ,      OB History:   OB History    Para Term  AB Living   2 1 1 0 0 1   SAB IAB Ectopic Multiple Live Births   0 0 0 0 0      # Outcome Date GA Lbr Senthil/2nd Weight Sex Delivery Anes PTL Lv   2 Current            1 Term 19    M Vag-Spont          Medical History:   Past Medical History:   Diagnosis Date   • Anxiety        Surgical History:   Past Surgical History:   Procedure Laterality Date   • APPENDECTOMY         Social History:   Social History     Socioeconomic History   • Marital status:    • Number of children: 1   Occupational History   • Occupation: [employer]   Tobacco Use   • Smoking status: Never Smoker   • Smokeless tobacco: Never Used   Vaping Use   • Vaping Use: Never used   Substance and Sexual Activity   • Alcohol use: Not Currently   • Drug use: Never   • Sexual activity: Yes        Family History: No family history on file.    Allergies: Patient has no known allergies.    Prior to Admission medications    Medication Sig Start Date End Date Taking? Authorizing Provider   aspirin 81 mg enteric coated tablet Take 81 mg by mouth daily.    ProviderTrue MD   prenatal vit no.130-iron-folic 27 mg iron- 800 mcg tablet tablet Take 1 tablet by mouth daily.    ProviderTrue MD       Review of Systems  Pertinent items are noted in HPI.    Objective     Vital Signs for the last 24 hours:  Temp:  [36.8 °C (98.3 °F)] 36.8 °C (98.3 °F)  Heart Rate:  [84] 84  Resp:  [16-18] 16  BP: (111-116)/(69-71) 116/69    Latest cervical exam:      /-2              Fetal Monitoring:  FHR Baseline: 140  FHR Variability: moderate  FHR Accelerations: yes  FHR Decelerations: no    Contraction  Frequency: irregular    Exam:  General appearance: alert, appears stated age and cooperative  Abdomen: soft, non-tender; bowel sounds normal; no masses, no organomegaly        Labs:  ABO   Date Value Ref Range Status   2022 A  Final     Rh Factor   Date Value Ref Range Status   2022 Positive  Final     Rubella IgG Scr   Date Value Ref Range Status   2021 immune  Final     Strep Gp B Cult/DNA Probe   Date Value Ref Range Status   2022 No growth See table below, No growth at 18-24 hours, Probable contaminants, suggest recollection, No growth at 48 hours, No growth at 72 hours, No growth at 96 hours, No growth at 120 hours, No growth at 1 week, No growth at 2 weeks, No growth at 3 weeks, No gr... Final       Assessment/Plan     Joy Tai is a 37 y.o. female  at 38w6d admitted for labor management     FHR: Category I  GBS: negative   Epidural prn    Ad Andrew MD

## 2022-07-01 NOTE — ANESTHESIOLOGIST PRE-PROCEDURE ATTESTATION
Pre-Procedure Patient Identification:  I am the Primary Anesthesiologist and have identified the patient on 07/01/22 at 6:51 PM.   I have confirmed the procedure(s) will be performed by the following surgeon/proceduralist * No surgeons listed *.

## 2022-07-01 NOTE — ANESTHESIA PROCEDURE NOTES
Epidural Block    Patient location during procedure: OB  Start time: 7/1/2022 6:30 PM  End time: 7/1/2022 6:52 PM  Reason for block: labor analgesia requested by patient and obstetrician  Staffing  Performed: anesthesiologist   Anesthesiologist: Surjit Houston MD  Preanesthetic Checklist  Completed: patient identified, surgical consent, pre-op evaluation, timeout performed, IV checked, risks and benefits discussed, monitors and equipment checked and sterile field maintained during procedure  Needle  Needle gauge: 17 G  Needle length: 3.5 in  Catheter type: Single orifice  Catheter size: 19 G  Test dose: negative and lidocaine 1.5% with epinephrine 1-to-200,000  Additional Notes  Procedure well tolerated. Vital signs stable. No paresthesia.  Analgesia satisfactory. Patients nurse to notify anesthesiologist if hemodynamically unstable.    Medications Administered - 7/1/2022 6:46 PM   lidocaine 1.5%-EPINEPHrine 1:200,000 PF (XYLOCAINE W/EPI) injection - epidural   3 mL - 7/1/2022 6:46:00 PM

## 2022-07-01 NOTE — PROGRESS NOTES
Feels contraction spaced out  FHT - cat I  Cervix 3-4/80/-2 posterior  Pt request going home  Will discharge home  Labor precaution

## 2022-07-01 NOTE — H&P
HPI     Joy Tai is a 37 y.o. female  at 38w6d with an estimated due date of 2022, by Last Menstrual Period who presents with painful contractions since earlier today. Reports membranes swept in office Thursday. Uncomplaictad prenatal course, Normal fetal activity, denies LOF/Bleeding/HA/BV/N/V/RUQ pain/UE edema.     Last PO intake:   ,     ,      OB History:   OB History    Para Term  AB Living   2 1 1 0 0 1   SAB IAB Ectopic Multiple Live Births   0 0 0 0 0      # Outcome Date GA Lbr Senthil/2nd Weight Sex Delivery Anes PTL Lv   2 Current            1 Term 19    M Vag-Spont          Medical History:   Past Medical History:   Diagnosis Date   • Anxiety        Surgical History:   Past Surgical History:   Procedure Laterality Date   • APPENDECTOMY         Social History:   Social History     Socioeconomic History   • Marital status:      Spouse name: None   • Number of children: 1   • Years of education: None   • Highest education level: None   Occupational History   • Occupation: [employer]   Tobacco Use   • Smoking status: Never Smoker   • Smokeless tobacco: Never Used   Vaping Use   • Vaping Use: Never used   Substance and Sexual Activity   • Alcohol use: Not Currently   • Drug use: Never   • Sexual activity: Yes        Family History: No family history on file.    Allergies: Patient has no known allergies.    Prior to Admission medications    Medication Sig Start Date End Date Taking? Authorizing Provider   aspirin 81 mg enteric coated tablet Take 81 mg by mouth daily.   Yes True Liang MD   prenatal vit no.130-iron-folic 27 mg iron- 800 mcg tablet tablet Take 1 tablet by mouth daily.   Yes True Liang MD       Review of Systems  Pertinent items are noted in HPI.    Objective     Vital Signs for the last 24 hours:       Latest cervical exam:  Cervical Dilation (cm): 2-3  Cervical Effacement: 50  Fetal Station: -2  Method: sterile exam per RN  (22 0423)        Fetal Monitoring:  FHR Baseline: 140  FHR Variability: moderate  FHR Accelerations: present  FHR Decelerations: absent    Contraction Frequency: q 3 to 4    Exam:  General Appearance: Alert, cooperative, no acute distress  Lungs: Clear to auscultation bilaterally, respirations unlabored  Heart: Regular rate and rhythm, S1 and S2 normal, no murmur, rub or gallop  Abdomen: gravid, nontender  Genitalia: See vaginal exam  Extremities: no edema or calf tenderness  Neurologic: grossly intact without focal deficits    Ultrasounds:   Not applicable        Labs:  Strep Gp B Cult/DNA Probe   Date Value Ref Range Status   2022 No growth See table below, No growth at 18-24 hours, Probable contaminants, suggest recollection, No growth at 48 hours, No growth at 72 hours, No growth at 96 hours, No growth at 120 hours, No growth at 1 week, No growth at 2 weeks, No growth at 3 weeks, No gr... Final       Assessment/Plan     Joy Tai is a 37 y.o. female  at 38w6d admitted for labor management , IVF,Labs, Covid screen, Epidural prn, AROM prn    FHR: Category I  GBS: negative    Chad Schneider MD

## 2022-07-01 NOTE — ANESTHESIA PREPROCEDURE EVALUATION
Anesthesia ROS/MED HX    Anesthesia History - neg      Relevant Problems   No relevant active problems       Physical Exam    Airway   Mallampati: I   TM distance: <3 FB   Neck ROM: full  Cardiovascular - normal   Rhythm: regular   Rate: normalPulmonary - normal   clear to auscultation  Other Findings   Back - neg   landmarks identified          Anesthesia Plan    Plan: labor epidural   ASA 2  Anesthetic plan and risks discussed with: patient

## 2022-07-02 PROBLEM — Z3A.39 39 WEEKS GESTATION OF PREGNANCY: Status: ACTIVE | Noted: 2022-07-02

## 2022-07-02 PROCEDURE — 63700000 HC SELF-ADMINISTRABLE DRUG: Performed by: OBSTETRICS & GYNECOLOGY

## 2022-07-02 PROCEDURE — 12000000 HC ROOM AND CARE MED/SURG

## 2022-07-02 RX ORDER — AMOXICILLIN 250 MG
1 CAPSULE ORAL 2 TIMES DAILY
Status: DISCONTINUED | OUTPATIENT
Start: 2022-07-02 | End: 2022-07-03 | Stop reason: HOSPADM

## 2022-07-02 RX ORDER — LIDOCAINE HYDROCHLORIDE 10 MG/ML
0-30 INJECTION, SOLUTION EPIDURAL; INFILTRATION; INTRACAUDAL; PERINEURAL ONCE AS NEEDED
Status: DISCONTINUED | OUTPATIENT
Start: 2022-07-02 | End: 2022-07-03 | Stop reason: HOSPADM

## 2022-07-02 RX ORDER — OXYTOCIN 10 [USP'U]/ML
10 INJECTION, SOLUTION INTRAMUSCULAR; INTRAVENOUS ONCE AS NEEDED
Status: DISCONTINUED | OUTPATIENT
Start: 2022-07-02 | End: 2022-07-03 | Stop reason: HOSPADM

## 2022-07-02 RX ORDER — DIBUCAINE 1 %
1 OINTMENT (GRAM) TOPICAL AS NEEDED
Status: DISCONTINUED | OUTPATIENT
Start: 2022-07-02 | End: 2022-07-03 | Stop reason: HOSPADM

## 2022-07-02 RX ORDER — IBUPROFEN 600 MG/1
600 TABLET ORAL EVERY 6 HOURS PRN
Status: DISCONTINUED | OUTPATIENT
Start: 2022-07-02 | End: 2022-07-02

## 2022-07-02 RX ORDER — MISOPROSTOL 200 UG/1
1000 TABLET ORAL ONCE AS NEEDED
Status: DISCONTINUED | OUTPATIENT
Start: 2022-07-02 | End: 2022-07-03 | Stop reason: HOSPADM

## 2022-07-02 RX ORDER — ALUMINUM HYDROXIDE, MAGNESIUM HYDROXIDE, AND SIMETHICONE 1200; 120; 1200 MG/30ML; MG/30ML; MG/30ML
30 SUSPENSION ORAL EVERY 4 HOURS PRN
Status: DISCONTINUED | OUTPATIENT
Start: 2022-07-02 | End: 2022-07-03 | Stop reason: HOSPADM

## 2022-07-02 RX ORDER — CARBOPROST TROMETHAMINE 250 UG/ML
250 INJECTION, SOLUTION INTRAMUSCULAR ONCE AS NEEDED
Status: DISCONTINUED | OUTPATIENT
Start: 2022-07-02 | End: 2022-07-03 | Stop reason: HOSPADM

## 2022-07-02 RX ORDER — TRANEXAMIC ACID 10 MG/ML
1000 INJECTION, SOLUTION INTRAVENOUS ONCE AS NEEDED
Status: DISCONTINUED | OUTPATIENT
Start: 2022-07-02 | End: 2022-07-03 | Stop reason: HOSPADM

## 2022-07-02 RX ORDER — ACETAMINOPHEN 325 MG/1
650 TABLET ORAL EVERY 4 HOURS PRN
Status: DISCONTINUED | OUTPATIENT
Start: 2022-07-02 | End: 2022-07-03 | Stop reason: HOSPADM

## 2022-07-02 RX ORDER — METHYLERGONOVINE MALEATE 0.2 MG/ML
200 INJECTION INTRAVENOUS ONCE AS NEEDED
Status: DISCONTINUED | OUTPATIENT
Start: 2022-07-02 | End: 2022-07-03 | Stop reason: HOSPADM

## 2022-07-02 RX ORDER — IBUPROFEN 400 MG/1
400 TABLET ORAL EVERY 6 HOURS PRN
Status: DISCONTINUED | OUTPATIENT
Start: 2022-07-02 | End: 2022-07-03 | Stop reason: HOSPADM

## 2022-07-02 RX ORDER — CALCIUM CARBONATE 200(500)MG
500 TABLET,CHEWABLE ORAL EVERY 4 HOURS PRN
Status: DISCONTINUED | OUTPATIENT
Start: 2022-07-02 | End: 2022-07-03 | Stop reason: HOSPADM

## 2022-07-02 RX ORDER — IBUPROFEN 600 MG/1
600 TABLET ORAL
Status: DISCONTINUED | OUTPATIENT
Start: 2022-07-02 | End: 2022-07-03 | Stop reason: HOSPADM

## 2022-07-02 RX ADMIN — IBUPROFEN 600 MG: 600 TABLET ORAL at 01:47

## 2022-07-02 RX ADMIN — PRENATAL VIT W/ FE FUMARATE-FA TAB 27-0.8 MG 1 TABLET: 27-0.8 TAB at 08:05

## 2022-07-02 RX ADMIN — IBUPROFEN 600 MG: 600 TABLET, FILM COATED ORAL at 15:24

## 2022-07-02 RX ADMIN — IBUPROFEN 600 MG: 600 TABLET, FILM COATED ORAL at 20:28

## 2022-07-02 RX ADMIN — SENNOSIDES AND DOCUSATE SODIUM 1 TABLET: 50; 8.6 TABLET ORAL at 08:05

## 2022-07-02 RX ADMIN — ACETAMINOPHEN 650 MG: 325 TABLET ORAL at 04:00

## 2022-07-02 RX ADMIN — SENNOSIDES AND DOCUSATE SODIUM 1 TABLET: 50; 8.6 TABLET ORAL at 20:28

## 2022-07-02 RX ADMIN — IBUPROFEN 600 MG: 600 TABLET, FILM COATED ORAL at 08:05

## 2022-07-02 ASSESSMENT — PAIN SCALES - GENERAL: PAIN_LEVEL: 4

## 2022-07-02 NOTE — L&D DELIVERY NOTE
OB Vaginal Delivery Note    Delivery:2022 at 11:51 PM   Patient:Joy Tai  :1985    Review the Delivery Report for details.     Delivery Details    Pre-Op Diagnosis: 1. 37 y.o.  intrauterine pregnancy at 39w0d with gil gestation.  2. Active labor  3. AMA   Post-Op Diagnosis: 1. Same, s/p delivery of viable female infant   Delivery Clinician: Theresa Martel    Delivery Assist;Delivery Nurse;Nursery Nurse  ;Theresa Roy;Alesia Garcia    Delivery Type: Vaginal, Spontaneous    Labor Complications:     EBL: 200  mL   Anesthesia Type: Epidural    Placenta Delivery Spontaneous    Placenta Disposition: discarded    Additional Specimens None      INFANT INFORMATION  Time of Birth:11:51 PM   Presentation: Vertex   Position:Left ,Occiput ,Anterior   Cord:  ,Complications:   Troutman Sex: female   Troutman Weight:       1 Minute 5 Minute 10 Minute   Apgar Totals:              Information for the patient's :  Goran Tai [[id number]]     Troutman Cord Gas     None           Delivery Details:    Joy Tai is a 37 y.o.  at 39w0d gestation who presented to the hospital for Term pregnancy [Z34.90].  Her labor was spontaneous.  The patient progressed to fully dilated and pushed for  hours and   minutes.  A viable  female  infant was delivered by Vaginal, Spontaneous  from Left ,Occiput ,Anterior .  The anterior and posterior shoulders delivered without difficulty followed by the remainder of the infant. A spontaneous cry was heard, and the infant appeared to be moving all 4 extremities. The cord was clamped and cut with   noted.  The placenta delivered spontaneously shortly thereafter.  Fundal massage was performed and the fundus was found to be firm, and lochia was within normal limits. The perineum, vagina, cervix were inspected, and the following lacerations were noted:      Episiotomy: None    Lacerations:   Perineal: 2nd  Repaired: Yes     Periurethral:     Repaired:     Labial:   Repaired:     Sulcus:   Repaired:     Vaginal:   Repaired:     Cervical:    Repaired:        Any lacerations were repaired in the usual fashion using 3-0 Synthetic Suture  suture. Excellent hemostasis was noted. At the completion of the case, sponge and needle counts were correct. The infant and patient were left in the delivery room in stable condition.     Attending Attestation: I was present and scrubbed for the entire procedure.    Theresa Kulkarni MD

## 2022-07-02 NOTE — DISCHARGE SUMMARY
Obstetrical Discharge Form          Date of Delivery: 7/1/2022 at 11:51 PM     Gestational Age:38w6d    Delivered By: Theresa Martel     Delivery Type: spontaneous vaginal delivery    Antepartum complications: none    Baby: Liveborn female , Apgars 9  /9  , 3.175 kg (7 lb)     Anesthesia: Epidural     Intrapartum complications: None    Laceration: 2nd     Feeding method: breast    Rh Immune globulin given: not applicable    Discharge Date: 7/3/2022      Plan:    Follow-up appointment with your doctors in 6 weeks, please call for an appointment

## 2022-07-02 NOTE — PROGRESS NOTES
Labor and Delivery Progress Note    Subjective     Patient comfortable s/p epidural  S/p SROM    Objective     Vital Signs for the last 24 hours:  Temp:  [36.8 °C (98.3 °F)-37 °C (98.6 °F)] 37 °C (98.6 °F)  Heart Rate:  [84-98] 89  Resp:  [16-18] 16  BP: (109-116)/(59-71) 109/59     Fetal Monitoring:  FHR Baseline: 140  FHR Variability: moderate  FHR Accelerations: present  FHR Decelerations: absent    Contraction Frequency: q5    IUPC: no    Latest cervical exam:  Cervical Dilation (cm): 6-7  Cervical Effacement: 80  Fetal Station: -2  Method: sterile exam per physician (22)           Current Facility-Administered Medications:   •  fentaNYL-ropivacaine-NaCl (PF) 2 mcg/mL - 0.15% PCEA syringe, , epidural, Continuous, Day, Surjit PRADO MD, Last Rate: 10 mL/hr at 22, 50 mL at 22  •  lactated ringer's infusion, 125 mL/hr, intravenous, Continuous, AndrewAd MD, Last Rate: 125 mL/hr at 22, 125 mL/hr at 22    Assessment/Plan     Joy Tai is a 37 y.o. female  at 38w6d admitted with labor management     1) FHR: Category I  2) GBS:  negative  3) Will continue with expectant management     Theresa Kulkarni MD

## 2022-07-02 NOTE — PROGRESS NOTES
Obstetrics Postpartum Progress Note    Events  No acute events overnight.    Subjective  Pain: yes  Bleeding: lochia minimal  Diet: taking regular diet  Voiding: without difficulty  Ambulating: as tolerated    Vitals  Temp:  [36.7 °C (98.1 °F)-37.8 °C (100 °F)] 36.7 °C (98.1 °F)  Heart Rate:  [] 79  Resp:  [16-18] 16  BP: ()/(51-79) 112/59    I&O    Intake/Output Summary (Last 24 hours) at 2022 1113  Last data filed at 2022 0519  Gross per 24 hour   Intake --   Output 1000 ml   Net -1000 ml       Physical Exam  General: well  Abdomen: soft, nondistended, non-tender  Fundus: firm and at umbilicus  Perineum: deferred  Extremities: symmetric and no edema    Labs  Labs Reviewed:  No results found for: ABO, LABRH     Assessment/Plan   Problem-based Assessment and Plan    Joy Tai is a 37 y.o.  postpartum day 1 s/p Vaginal, Spontaneous .    1. Continue current plan for post partum management  2. Discharge home tomorrow    Que Page DO  Pager: [phone]  Mobile: [phone]

## 2022-07-02 NOTE — ANESTHESIA POSTPROCEDURE EVALUATION
Patient: Joy Tai    Procedure Summary     Date: 07/01/22 Room / Location:     Anesthesia Start: 1838 Anesthesia Stop: 2351    Procedure: Labor Analgesia Diagnosis:     Scheduled Providers:  Responsible Provider: Surjit Houston MD    Anesthesia Type: labor epidural ASA Status: 2          Anesthesia Type: labor epidural  PACU Vitals    No data found in the last 10 encounters.           Anesthesia Post Evaluation    Pain score: 4  Pain management: satisfactory to patient  Mode of pain management: IV medication  Patient location during evaluation: PACU  Patient participation: complete - patient participated  Level of consciousness: awake  Cardiovascular status: acceptable  Airway Patency: adequate  Respiratory status: acceptable  Hydration status: stable  Anesthetic complications: no

## 2022-07-02 NOTE — PLAN OF CARE
Problem: Adult Inpatient Plan of Care  Goal: Plan of Care Review  Outcome: Progressing  Goal: Patient-Specific Goal (Individualized)  Outcome: Progressing  Goal: Absence of Hospital-Acquired Illness or Injury  Outcome: Progressing  Goal: Optimal Comfort and Wellbeing  Outcome: Progressing  Goal: Readiness for Transition of Care  Outcome: Progressing     Problem: Adjustment to Role Transition (Postpartum Vaginal Delivery)  Goal: Successful Maternal Role Transition  Outcome: Progressing     Problem: Bleeding (Postpartum Vaginal Delivery)  Goal: Hemostasis  Outcome: Progressing     Problem: Infection (Postpartum Vaginal Delivery)  Goal: Absence of Infection Signs and Symptoms  Outcome: Progressing     Problem: Pain (Postpartum Vaginal Delivery)  Goal: Acceptable Pain Control  Outcome: Progressing     Problem: Urinary Retention (Postpartum Vaginal Delivery)  Goal: Effective Urinary Elimination  Outcome: Progressing

## 2022-07-02 NOTE — DISCHARGE INSTRUCTIONS
POSTPARTUM DISCHARGE INSTRUCTIONS  Dr. Que Page - Dr. Chad Schneider  Telephone [phone]      If you had a vaginal delivery call for postpartum appointment to be seen in 6 weeks  If you had a  section call for a postoperative appointment to be seen in 1 week.     Activities/Restrictions:  1. No driving for 2 weeks or if still taking narcotics for analgesia.  2. No tub baths for 4 weeks  3. No tampons, douching, intercourse for 4 weeks  4. No lifting anything heavier than your baby for 4 weeks  5. No strenuous exercise for 4 weeks.    Medications:   1. Resume prenatal vitamins. After 4 weeks start non-prenatal vitamins.  2. Colace 100mg OTC tablet twice daily for constipation  3. For hemorrhoids, use Tucks pads, Preparation H or Proctofoam as needed.  4. For cracked/sore nipples you may use Lanolin cream.    Resume regular diet.     Episiotomy or laceration  1. Apply cold packs or chilled witch-bebeto pads to the area  2. Take sitz baths (soaking in a few inches of warm water will help)  3. Use over the counter Dermaplast spray  4. Apply warm water to the area after urination using a squeeze water bottle  5. Always wipe from front to back to prevent infection     section incision  1. Keep you incision clean and dry.  2. Gently wash over the incision with warm soapy water  3. Do not apply creams or lotions to the incision  4. Your steri-strips may fall off on their own, otherwise remove them in the shower after 7 days    Call if...  1. Heavy vaginal bleeding (soaking more than one pad per hour)  2. Increasing redness or swelling at or near your incision or episiotomy site  3. Inability to tolerate food or drink without vomiting  4. Opening, bleeding, discharge or separation of your incision or episiotomy site  5. Foul smelling discharge  6. Chills or fever over 100.4 degrees Fahrenheit by mouth  7. Increasing pain (not relieved by pain medication)  8. Severe headache  9. One swollen  leg  10. Depression, sadness, lack of interest in caring for baby

## 2022-07-03 VITALS
RESPIRATION RATE: 18 BRPM | HEART RATE: 66 BPM | SYSTOLIC BLOOD PRESSURE: 113 MMHG | DIASTOLIC BLOOD PRESSURE: 69 MMHG | TEMPERATURE: 97.9 F | OXYGEN SATURATION: 98 %

## 2022-07-03 PROCEDURE — 63700000 HC SELF-ADMINISTRABLE DRUG: Performed by: OBSTETRICS & GYNECOLOGY

## 2022-07-03 RX ADMIN — IBUPROFEN 600 MG: 600 TABLET, FILM COATED ORAL at 03:08

## 2022-07-03 RX ADMIN — IBUPROFEN 600 MG: 600 TABLET, FILM COATED ORAL at 08:24

## 2022-07-03 RX ADMIN — SENNOSIDES AND DOCUSATE SODIUM 1 TABLET: 50; 8.6 TABLET ORAL at 08:25

## 2022-07-03 RX ADMIN — PRENATAL VIT W/ FE FUMARATE-FA TAB 27-0.8 MG 1 TABLET: 27-0.8 TAB at 08:24

## 2023-01-23 ENCOUNTER — TELEPHONE (OUTPATIENT)
Dept: ADMISSIONS | Facility: HOSPITAL | Age: 38
End: 2023-01-23
Payer: COMMERCIAL

## 2023-01-23 NOTE — TELEPHONE ENCOUNTER
Initial Intake    Joy Tai, a 37 y.o. 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    Current Mental Health Symptoms  Current Symptoms: None    Mental Health Treatment History  Prior Treatment Reported?: Yes  Type of Treatment: Outpatient    Outpatient  Details: some counseling during Covid 2020 , I did not have these feeling when my first was born  Was the treatment voluntary?: Yes  Was treatment completed?: Yes    Medical  Extensive History: None  Medications: Lexapro just started , by OBGYN    Suicide Thoughts/Plans  Have you had suicidal thoughts in the past 72 hours?: No  Have you ever had suicidal thoughts?: No  Have you ever harmed yourself?: No  Do you have easy access to firearms?: No    Violence/Trauma  Do you currently have, or have you ever had, thoughts of harming someone else?: No  Any history of an event that you would consider emotionally/psychologically/physically traumatic?: extreme nausea during pregnancy not easy    Employment and Legal  Are you actively employed?: Yes    Substance Use Details  Substance Use Includes: None      Eating Disorders  Do you have any problematic food related behaviors?: No  Have you ever been preoccupied with your looks or body image?: No    Additional Information  Determination: [facility] Evaluation                  Referred To Facility: [facility]

## 2023-01-31 ENCOUNTER — OFFICE VISIT (OUTPATIENT)
Dept: BEHAVIORAL HEALTH | Facility: CLINIC | Age: 38
End: 2023-01-31
Payer: COMMERCIAL

## 2023-01-31 DIAGNOSIS — F41.1 GENERALIZED ANXIETY DISORDER: Primary | ICD-10-CM

## 2023-01-31 PROCEDURE — 90837 PSYTX W PT 60 MINUTES: CPT | Performed by: SOCIAL WORKER
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
## 2023-02-01 ASSESSMENT — COGNITIVE AND FUNCTIONAL STATUS - GENERAL
RECENT MEMORY: WNL
REMOTE MEMORY: WNL
EYE_CONTACT: WNL
THOUGHT_CONTENT: APPROPRIATE
APPEARANCE: WELL GROOMED
ATTENTION: WNL
APPETITE: NO CHANGE
PSYCHOMOTOR FUNCTIONING: WNL
SLEEP_WAKE_CYCLE: NO CHANGE
AROUSAL LEVEL: ALERT
ORIENTATION: FULLY ORIENTED
SPEECH: REGULAR
AFFECT: FULL RANGE
IMPULSE CONTROL: INTACT
CONCENTRATION: WNL

## 2023-02-07 ENCOUNTER — TELEMEDICINE (OUTPATIENT)
Dept: BEHAVIORAL HEALTH | Facility: CLINIC | Age: 38
End: 2023-02-07
Payer: COMMERCIAL

## 2023-02-07 DIAGNOSIS — F41.1 GENERALIZED ANXIETY DISORDER: Primary | ICD-10-CM

## 2023-02-07 PROCEDURE — 90837 PSYTX W PT 60 MINUTES: CPT | Mod: 95 | Performed by: SOCIAL WORKER

## 2023-02-07 NOTE — PROGRESS NOTES
"[facility] - Psychotherapy Follow-up Visit Note  Visit number:  2     Visit Type Performed: Video   Communication platform used for this encounter:  [employer] Video Visit (Epic Video Client)     Joy Tai was contacted today for a behavioral health visit.  Clinician confirmed identification of patient by name and birthdate, provider name, location of patient and clinician, and callback number in case disconnected.    Verification of Patient Location:  The patient affirms they are currently located in the following state: [place]  This visit was conducted via telehealth/telephone in lieu of an in-person visit due to precautions related to the COVID-19 pandemic.    Request for Consent:  You and I are about to have a tele-health check-in or visit. This is allowed because you are already a patient of our [facility] practice, and you have requested it.  This tele-health visit will be billed to your health insurance or you, if you are self-insured. You understand you will be responsible for any copayments or coinsurances that apply to your tele-health visit.  Before starting our telemedicine visit, I am required to get your consent for this virtual check-in or visit by tele-health . Do you consent?    Patient Response to Request for Consent: Yes        SUBJECTIVE     Symptoms  Depression symptoms: sadness, irritable mood, fatigue/lack of energy, feelings of worthlessness/guilt and difficulty concentrating or making decisions  Anxiety symptoms: excessive anxiety/worry, restless/\"on edge\", irritability and sleep disturbance  Additional reported symptoms: NA      OBJECTIVE     Mental Status Evaluation  Patient's mood and affect were consistent with the context, and consistent with their baseline: Yes   Comments:  Patient reports that she is lacking sleep due to the fact that her 3 year old son is having fears of the dark and is crying at night.  She has engaged in soothing activities with him to help " calm him.  We discussed how develepmentally he is developing a more active imagination which makes it so that his fear of the dark is not uncommon.  However, he may also be overstimulated while in school since  is a new experience for him, and despite the fact that he is taking naps again, he may still need to go to bed as early as he had before he began .  We talked about various different ways that she can approach bed time with him, which may help Joy to get the sleep her body needs to reduce her depression and anxiety.  We also processed feelings of guilt that she is experiencing, and normalized the things she worries about.  Patient did indicate that she feels better since our last appointment, and she has decided to begin taking her prescribed Lexapro (5mgs) which she began on Sat.    Additional Assessments completed this visit  NA      Suicidal Ideation/Homicidal Ideation Risk Assessment: not assessed. If not assessed, reason:    Risk Factors: None indicated  Protective factors: Effective and accessible mental health care , Connectedness to individuals, family, community, and social institutions and Problem-solving and conflict resolution skills    Suicidal Ideation: Not Present  Self Injurious Behavior:  Not Present  Homicidal Ideation: Not Present  Estimate of Current Risk: NA    Plan for Safety-   N/A:  Risk is assessed to be minimal; therefore, developing a safety plan is not indicated at this time.          Interventions  Anxiety Reduction Techniques, Cognitive Behavior Therapy, Empathic Listening and Validation and Psychoeducation      Psychotropic medications: no known adherence challenges, too early to assess effectiveness   Current Outpatient Medications   Medication Sig Dispense Refill   • prenatal vit no.130-iron-folic 27 mg iron- 800 mcg tablet tablet Take 1 tablet by mouth daily.       No current facility-administered medications for this visit.       ASSESSMENT        Progress  Patient's progress toward their goals is generally improving slightly  Patient's symptomology is gradually improving        PLAN     Goals:  Help patient to feel more comfortable with her choices, use self-talk to process emotions that trigger anxiety.       Recommendations for treatment: Individual Therapy, weekly     Recommendations for Interventions: Cognitive Behavior Therapy, Collaborative Problem Solving and Empathic Listening and Validation  Next visit plan:  Identify which techniques are helping    I spent 45 minutes on this date of service performing the following activities: providing counseling and education.

## 2023-02-21 ENCOUNTER — TELEMEDICINE (OUTPATIENT)
Dept: BEHAVIORAL HEALTH | Facility: CLINIC | Age: 38
End: 2023-02-21
Payer: COMMERCIAL

## 2023-02-21 DIAGNOSIS — F41.1 GENERALIZED ANXIETY DISORDER: Primary | ICD-10-CM

## 2023-02-21 PROCEDURE — 90834 PSYTX W PT 45 MINUTES: CPT | Mod: 95 | Performed by: SOCIAL WORKER

## 2023-02-21 NOTE — PROGRESS NOTES
"[facility] - Psychotherapy Follow-up Visit Note  Visit number: 3    Visit Type Performed: Video   Communication platform used for this encounter:  [employer] Video Visit (Epic Video Client)     Joy Tai was contacted today for a behavioral health visit.  Clinician confirmed identification of patient by name and birthdate, provider name, location of patient and clinician, and callback number in case disconnected.    Verification of Patient Location:  The patient affirms they are currently located in the following state: [place]  This visit was conducted via telehealth/telephone in lieu of an in-person visit due to precautions related to the COVID-19 pandemic.    Request for Consent:  You and I are about to have a tele-health check-in or visit. This is allowed because you are already a patient of our [facility] practice, and you have requested it.  This tele-health visit will be billed to your health insurance or you, if you are self-insured. You understand you will be responsible for any copayments or coinsurances that apply to your tele-health visit.  Before starting our telemedicine visit, I am required to get your consent for this virtual check-in or visit by tele-health . Do you consent?    Patient Response to Request for Consent: Yes        SUBJECTIVE   Symptoms  Depression symptoms: sadness, irritable mood, fatigue/lack of energy, feelings of worthlessness/guilt and difficulty concentrating or making decisions  Anxiety symptoms: excessive anxiety/worry, restless/\"on edge\", irritability and sleep disturbance  Additional reported symptoms: NA        OBJECTIVE      Mental Status Evaluation  Patient's mood and affect were consistent with the context, and consistent with their baseline: Yes   Comments:  Joy is feeling really stretched between her work responsibilities and parenting responsibilities and the fact that her children keep getting sick which has her putting her job on hold to take " care of them.  She feels like she is constantly letting people down.      Additional Assessments completed this visit  NA      Suicidal Ideation/Homicidal Ideation Risk Assessment: not assessed. If not assessed, reason:  Previous evaluation indicated minimal risk. Pt did not endorse any changes in risk or protective factors and none were observed.  Risk Factors: None indicated  Protective factors: Effective and accessible mental health care , Connectedness to individuals, family, community, and social institutions and Problem-solving and conflict resolution skills     Suicidal Ideation: Not Present  Self Injurious Behavior:  Not Present  Homicidal Ideation: Not Present  Estimate of Current Risk: NA     Plan for Safety-   N/A:  Risk is assessed to be minimal; therefore, developing a safety plan is not indicated at this time.     Interventions  Anxiety Reduction Techniques, Cognitive Behavior Therapy, Empathic Listening and Validation and Psychoeducation  Using cognitive restructuring to re-frame how Joy perceives her level of responsibility and how she feels others are perceiving her.  Also encouraged her to use communication skills to get the support she needs from her spouse.        Psychotropic medications: no known adherence challenges, too early to assess effectiveness   Current Medications          Current Outpatient Medications   Medication Sig Dispense Refill   • prenatal vit no.130-iron-folic 27 mg iron- 800 mcg tablet tablet Take 1 tablet by mouth daily.          No current facility-administered medications for this visit.            ASSESSMENT         Progress  Patient's progress toward their goals is generally the same  Patient's symptomology is generally the same per patient subjective report        PLAN      Goals:  Help patient to feel more comfortable with her choices, use self-talk to process emotions that trigger anxiety.       Recommendations for treatment: Individual  Therapy, weekly     Recommendations for Interventions: Cognitive Behavior Therapy, Collaborative Problem Solving and Empathic Listening and Validation    Next visit plan:  Utilize cognitive restructuring and communication skills     I spent 45 minutes on this date of service performing the following activities: providing counseling and education.

## 2023-03-16 ENCOUNTER — TELEPHONE (OUTPATIENT)
Dept: BEHAVIORAL HEALTH | Facility: CLINIC | Age: 38
End: 2023-03-16
Payer: COMMERCIAL

## 2023-03-16 NOTE — TELEPHONE ENCOUNTER
Left message on patient's cell number to contact [facility] to reschedule their telehealth appointment. Patient cancelled appointment with Kristan Odonnell on 3/14/23 at 11:30am.

## 2023-08-03 NOTE — DISCHARGE SUMMARY
Discharge Summary         Patient Name: Joy Tai    : 1985    Treatment start date: 23    Date of last visit: 23           Discharge Reason: Against Medical Advice         Reason for admission and treatment: SIDNEY         Services offered and response to treatment: Outpatient Therapy         Client status - Condition upon discharge: stable             Clinical concerns to be addressed in continued care: NA         Aftercare appointments: None         Special Instructions: None                      Mental Health Crisis Support:     [facility] Crisis Number: [phone]    [facility] Crisis Number: [phone]    [facility] Crisis Number: [phone]    [facility] Crisis Number: [phone]                 Kristan Odonnell LCSW @ 12:18 PM

## 2023-11-29 ENCOUNTER — TRANSCRIBE ORDERS (OUTPATIENT)
Dept: REGISTRATION | Facility: CLINIC | Age: 38
End: 2023-11-29

## 2023-11-29 ENCOUNTER — HOSPITAL ENCOUNTER (OUTPATIENT)
Dept: RADIOLOGY | Facility: CLINIC | Age: 38
Discharge: HOME | End: 2023-11-29
Attending: INTERNAL MEDICINE
Payer: COMMERCIAL

## 2023-11-29 DIAGNOSIS — Z12.31 ENCOUNTER FOR SCREENING MAMMOGRAM FOR MALIGNANT NEOPLASM OF BREAST: ICD-10-CM

## 2023-11-29 DIAGNOSIS — Z12.31 ENCOUNTER FOR SCREENING MAMMOGRAM FOR MALIGNANT NEOPLASM OF BREAST: Primary | ICD-10-CM

## 2023-11-29 PROCEDURE — 77067 SCR MAMMO BI INCL CAD: CPT

## 2023-12-26 ENCOUNTER — TRANSCRIBE ORDERS (OUTPATIENT)
Dept: SCHEDULING | Age: 38
End: 2023-12-26

## 2023-12-26 DIAGNOSIS — R92.8 OTHER ABNORMAL AND INCONCLUSIVE FINDINGS ON DIAGNOSTIC IMAGING OF BREAST: Primary | ICD-10-CM

## 2024-05-15 ENCOUNTER — APPOINTMENT (OUTPATIENT)
Age: 39
Setting detail: DERMATOLOGY
End: 2024-05-20

## 2024-05-15 DIAGNOSIS — L81.4 OTHER MELANIN HYPERPIGMENTATION: ICD-10-CM

## 2024-05-15 DIAGNOSIS — L57.8 OTHER SKIN CHANGES DUE TO CHRONIC EXPOSURE TO NONIONIZING RADIATION: ICD-10-CM

## 2024-05-15 DIAGNOSIS — D49.2 NEOPLASM OF UNSPECIFIED BEHAVIOR OF BONE, SOFT TISSUE, AND SKIN: ICD-10-CM

## 2024-05-15 DIAGNOSIS — L82.1 OTHER SEBORRHEIC KERATOSIS: ICD-10-CM

## 2024-05-15 DIAGNOSIS — D18.0 HEMANGIOMA: ICD-10-CM

## 2024-05-15 DIAGNOSIS — D22 MELANOCYTIC NEVI: ICD-10-CM

## 2024-05-15 PROBLEM — D22.5 MELANOCYTIC NEVI OF TRUNK: Status: ACTIVE | Noted: 2024-05-15

## 2024-05-15 PROBLEM — D18.01 HEMANGIOMA OF SKIN AND SUBCUTANEOUS TISSUE: Status: ACTIVE | Noted: 2024-05-15

## 2024-05-15 PROCEDURE — OTHER BIOPSY BY SHAVE METHOD: OTHER

## 2024-05-15 PROCEDURE — OTHER OBSERVATION: OTHER

## 2024-05-15 PROCEDURE — OTHER COUNSELING: OTHER

## 2024-05-15 PROCEDURE — 11102 TANGNTL BX SKIN SINGLE LES: CPT

## 2024-05-15 PROCEDURE — OTHER SUNSCREEN RECOMMENDATIONS: OTHER

## 2024-05-15 PROCEDURE — 99213 OFFICE O/P EST LOW 20 MIN: CPT | Mod: 25

## 2024-05-15 ASSESSMENT — LOCATION DETAILED DESCRIPTION DERM
LOCATION DETAILED: XIPHOID
LOCATION DETAILED: MIDDLE STERNUM
LOCATION DETAILED: LOWER STERNUM
LOCATION DETAILED: RIGHT INFERIOR UPPER BACK
LOCATION DETAILED: LEFT ANTERIOR SHOULDER
LOCATION DETAILED: RIGHT MID-UPPER BACK

## 2024-05-15 ASSESSMENT — LOCATION SIMPLE DESCRIPTION DERM
LOCATION SIMPLE: RIGHT UPPER BACK
LOCATION SIMPLE: LEFT SHOULDER
LOCATION SIMPLE: ABDOMEN
LOCATION SIMPLE: CHEST

## 2024-05-15 ASSESSMENT — LOCATION ZONE DERM
LOCATION ZONE: ARM
LOCATION ZONE: TRUNK

## 2024-05-15 NOTE — PROCEDURE: BIOPSY BY SHAVE METHOD
Detail Level: Detailed
Depth Of Biopsy: dermis
Was A Bandage Applied: Yes
Size Of Lesion In Cm: 0
Biopsy Type: H and E
Biopsy Method: Personna blade
Anesthesia Type: 1% lidocaine without epinephrine
Anesthesia Volume In Cc: 0.5
Hemostasis: Aluminum Chloride
Wound Care: Petrolatum
Dressing: bandage
Destruction After The Procedure: No
Type Of Destruction Used: Curettage
Curettage Text: The wound bed was treated with curettage after the biopsy was performed.
Cryotherapy Text: The wound bed was treated with cryotherapy after the biopsy was performed.
Electrodesiccation Text: The wound bed was treated with electrodesiccation after the biopsy was performed.
Electrodesiccation And Curettage Text: The wound bed was treated with electrodesiccation and curettage after the biopsy was performed.
Silver Nitrate Text: The wound bed was treated with silver nitrate after the biopsy was performed.
Lab: -[id number]
Consent: Written consent was obtained and risks were reviewed including but not limited to scarring, infection, bleeding, scabbing, incomplete removal, nerve damage and allergy to anesthesia.
Post-Care Instructions: I reviewed with the patient in detail post-care instructions. Patient is to keep the biopsy site dry overnight.  Cleanse daily with soap and water and then apply Petrolatum and cover with a bandage for 3-5 days.
Notification Instructions: Patient will be notified of biopsy results. However, patient instructed to call the office if not contacted within 2 weeks.
Billing Type: Third-Party Bill
Information: Selecting Yes will display possible errors in your note based on the variables you have selected. This validation is only offered as a suggestion for you. PLEASE NOTE THAT THE VALIDATION TEXT WILL BE REMOVED WHEN YOU FINALIZE YOUR NOTE. IF YOU WANT TO FAX A PRELIMINARY NOTE YOU WILL NEED TO TOGGLE THIS TO 'NO' IF YOU DO NOT WANT IT IN YOUR FAXED NOTE.

## 2024-05-15 NOTE — PROCEDURE: SUNSCREEN RECOMMENDATIONS

## 2024-11-15 ENCOUNTER — APPOINTMENT (OUTPATIENT)
Age: 39
Setting detail: DERMATOLOGY
End: 2024-11-15

## 2024-11-15 DIAGNOSIS — L98.8 OTHER SPECIFIED DISORDERS OF THE SKIN AND SUBCUTANEOUS TISSUE: ICD-10-CM

## 2024-11-15 DIAGNOSIS — L57.8 OTHER SKIN CHANGES DUE TO CHRONIC EXPOSURE TO NONIONIZING RADIATION: ICD-10-CM

## 2024-11-15 DIAGNOSIS — L81.4 OTHER MELANIN HYPERPIGMENTATION: ICD-10-CM

## 2024-11-15 DIAGNOSIS — D18.0 HEMANGIOMA: ICD-10-CM

## 2024-11-15 DIAGNOSIS — L82.1 OTHER SEBORRHEIC KERATOSIS: ICD-10-CM

## 2024-11-15 DIAGNOSIS — D22 MELANOCYTIC NEVI: ICD-10-CM

## 2024-11-15 PROBLEM — D22.5 MELANOCYTIC NEVI OF TRUNK: Status: ACTIVE | Noted: 2024-11-15

## 2024-11-15 PROBLEM — D18.01 HEMANGIOMA OF SKIN AND SUBCUTANEOUS TISSUE: Status: ACTIVE | Noted: 2024-11-15

## 2024-11-15 PROCEDURE — OTHER SUNSCREEN RECOMMENDATIONS: OTHER

## 2024-11-15 PROCEDURE — 99213 OFFICE O/P EST LOW 20 MIN: CPT

## 2024-11-15 PROCEDURE — OTHER OBSERVATION: OTHER

## 2024-11-15 PROCEDURE — OTHER COUNSELING: OTHER

## 2024-11-15 ASSESSMENT — LOCATION DETAILED DESCRIPTION DERM
LOCATION DETAILED: XIPHOID
LOCATION DETAILED: MIDDLE STERNUM
LOCATION DETAILED: RIGHT VENTRAL TONGUE
LOCATION DETAILED: RIGHT MID-UPPER BACK
LOCATION DETAILED: RIGHT INFERIOR UPPER BACK
LOCATION DETAILED: LOWER STERNUM
LOCATION DETAILED: EPIGASTRIC SKIN

## 2024-11-15 ASSESSMENT — LOCATION ZONE DERM
LOCATION ZONE: TRUNK
LOCATION ZONE: MUCOUS_MEMBRANE

## 2024-11-15 ASSESSMENT — LOCATION SIMPLE DESCRIPTION DERM
LOCATION SIMPLE: CHEST
LOCATION SIMPLE: RIGHT UPPER BACK
LOCATION SIMPLE: VENTRAL TONGUE
LOCATION SIMPLE: ABDOMEN

## 2024-11-15 NOTE — PROCEDURE: COUNSELING
Detail Level: Generalized
Detail Level: Zone
Detail Level: Detailed
Patient Specific Counseling (Will Not Stick From Patient To Patient): -Pt following up with Dentist Thursday for further evaluation and management \\n-DWP plan to biopsy if lesion enlarges, bleeds or changes

## 2024-12-11 ENCOUNTER — HOSPITAL ENCOUNTER (OUTPATIENT)
Dept: RADIOLOGY | Facility: CLINIC | Age: 39
Discharge: HOME | End: 2024-12-11
Attending: STUDENT IN AN ORGANIZED HEALTH CARE EDUCATION/TRAINING PROGRAM
Payer: COMMERCIAL

## 2024-12-11 ENCOUNTER — TRANSCRIBE ORDERS (OUTPATIENT)
Dept: REGISTRATION | Facility: CLINIC | Age: 39
End: 2024-12-11

## 2024-12-11 DIAGNOSIS — Z12.31 ENCOUNTER FOR SCREENING MAMMOGRAM FOR MALIGNANT NEOPLASM OF BREAST: ICD-10-CM

## 2024-12-11 DIAGNOSIS — Z12.31 ENCOUNTER FOR SCREENING MAMMOGRAM FOR MALIGNANT NEOPLASM OF BREAST: Primary | ICD-10-CM

## 2024-12-11 PROCEDURE — 77063 BREAST TOMOSYNTHESIS BI: CPT

## 2024-12-11 PROCEDURE — 77067 SCR MAMMO BI INCL CAD: CPT

## 2025-04-01 ENCOUNTER — APPOINTMENT (OUTPATIENT)
Age: 40
Setting detail: DERMATOLOGY
End: 2025-04-01

## 2025-04-01 DIAGNOSIS — L72.8 OTHER FOLLICULAR CYSTS OF THE SKIN AND SUBCUTANEOUS TISSUE: ICD-10-CM

## 2025-04-01 PROCEDURE — OTHER COUNSELING: OTHER

## 2025-04-01 PROCEDURE — OTHER INTRALESIONAL KENALOG: OTHER

## 2025-04-01 PROCEDURE — 11900 INJECT SKIN LESIONS </W 7: CPT

## 2025-04-01 ASSESSMENT — LOCATION SIMPLE DESCRIPTION DERM: LOCATION SIMPLE: CHIN

## 2025-04-01 ASSESSMENT — LOCATION ZONE DERM: LOCATION ZONE: FACE

## 2025-04-01 ASSESSMENT — LOCATION DETAILED DESCRIPTION DERM: LOCATION DETAILED: LEFT CHIN

## 2025-04-01 NOTE — PROCEDURE: INTRALESIONAL KENALOG
Kenalog Type Of Vial: Multiple Dose
Consent: The risks of atrophy were reviewed with the patient.
Kenalog Preparation: Kenalog
How Many Mls Were Removed From The 40 Mg/Ml (5ml) Vial When Preparing The Injectable Solution?: 0
Lot # For Kenalog (Optional): [id number]
Treatment Number (Optional): 1
Expiration Date For Kenalog (Optional): 04/2027
Include Z78.9 (Other Specified Conditions Influencing Health Status) As An Associated Diagnosis?: No
Show Inventory Tab: Hide
Medical Necessity Clause: This procedure was medically necessary because the lesions that were treated were:
Validate Note Data When Using Inventory: Yes
Detail Level: Detailed
Total Volume (Ccs): 0.1
Ndc# For Kenalog Only: [phone] 20
Concentration Of Kenalog Solution Injected (Mg/Ml): 2.5
Administered By (Optional): Trupti Cabrera

## 2025-05-13 ENCOUNTER — TRANSCRIBE ORDERS (OUTPATIENT)
Dept: SCHEDULING | Age: 40
End: 2025-05-13

## 2025-05-13 DIAGNOSIS — N64.52 NIPPLE DISCHARGE: Primary | ICD-10-CM

## 2025-05-15 ENCOUNTER — HOSPITAL ENCOUNTER (OUTPATIENT)
Dept: RADIOLOGY | Facility: CLINIC | Age: 40
Discharge: HOME | End: 2025-05-15
Attending: REGISTERED NURSE
Payer: COMMERCIAL

## 2025-05-15 DIAGNOSIS — N64.52 NIPPLE DISCHARGE: ICD-10-CM

## 2025-05-15 PROCEDURE — 77065 DX MAMMO INCL CAD UNI: CPT | Mod: LT

## 2025-05-15 PROCEDURE — 76642 ULTRASOUND BREAST LIMITED: CPT | Mod: LT

## 2025-05-23 ENCOUNTER — APPOINTMENT (OUTPATIENT)
Age: 40
Setting detail: DERMATOLOGY
End: 2025-05-23

## 2025-05-23 DIAGNOSIS — L81.4 OTHER MELANIN HYPERPIGMENTATION: ICD-10-CM

## 2025-05-23 DIAGNOSIS — L57.8 OTHER SKIN CHANGES DUE TO CHRONIC EXPOSURE TO NONIONIZING RADIATION: ICD-10-CM

## 2025-05-23 DIAGNOSIS — D22 MELANOCYTIC NEVI: ICD-10-CM

## 2025-05-23 DIAGNOSIS — L20.89 OTHER ATOPIC DERMATITIS: ICD-10-CM

## 2025-05-23 DIAGNOSIS — L82.1 OTHER SEBORRHEIC KERATOSIS: ICD-10-CM

## 2025-05-23 DIAGNOSIS — D18.0 HEMANGIOMA: ICD-10-CM

## 2025-05-23 DIAGNOSIS — D49.2 NEOPLASM OF UNSPECIFIED BEHAVIOR OF BONE, SOFT TISSUE, AND SKIN: ICD-10-CM

## 2025-05-23 PROBLEM — D18.01 HEMANGIOMA OF SKIN AND SUBCUTANEOUS TISSUE: Status: ACTIVE | Noted: 2025-05-23

## 2025-05-23 PROBLEM — D22.5 MELANOCYTIC NEVI OF TRUNK: Status: ACTIVE | Noted: 2025-05-23

## 2025-05-23 PROCEDURE — 11102 TANGNTL BX SKIN SINGLE LES: CPT

## 2025-05-23 PROCEDURE — OTHER COUNSELING: OTHER

## 2025-05-23 PROCEDURE — OTHER BIOPSY BY SHAVE METHOD: OTHER

## 2025-05-23 PROCEDURE — 11103 TANGNTL BX SKIN EA SEP/ADDL: CPT

## 2025-05-23 PROCEDURE — 99213 OFFICE O/P EST LOW 20 MIN: CPT | Mod: 25

## 2025-05-23 PROCEDURE — OTHER PRESCRIPTION: OTHER

## 2025-05-23 PROCEDURE — OTHER SUNSCREEN RECOMMENDATIONS: OTHER

## 2025-05-23 RX ORDER — TRIAMCINOLONE ACETONIDE 1 MG/G
CREAM TOPICAL
Qty: 80 | Refills: 1 | Status: ERX | COMMUNITY
Start: 2025-05-23

## 2025-05-23 ASSESSMENT — ITCH NUMERIC RATING SCALE: HOW SEVERE IS YOUR ITCHING?: 8

## 2025-05-23 ASSESSMENT — LOCATION SIMPLE DESCRIPTION DERM
LOCATION SIMPLE: CHEST
LOCATION SIMPLE: RIGHT PRETIBIAL REGION
LOCATION SIMPLE: ABDOMEN
LOCATION SIMPLE: LEFT PRETIBIAL REGION
LOCATION SIMPLE: RIGHT LOWER BACK
LOCATION SIMPLE: RIGHT UPPER BACK

## 2025-05-23 ASSESSMENT — LOCATION DETAILED DESCRIPTION DERM
LOCATION DETAILED: RIGHT SUPERIOR LATERAL MIDBACK
LOCATION DETAILED: LOWER STERNUM
LOCATION DETAILED: LEFT PROXIMAL PRETIBIAL REGION
LOCATION DETAILED: RIGHT PROXIMAL PRETIBIAL REGION
LOCATION DETAILED: RIGHT MID-UPPER BACK
LOCATION DETAILED: RIGHT INFERIOR UPPER BACK
LOCATION DETAILED: XIPHOID
LOCATION DETAILED: MIDDLE STERNUM

## 2025-05-23 ASSESSMENT — BSA RASH: BSA RASH: 2

## 2025-05-23 ASSESSMENT — LOCATION ZONE DERM
LOCATION ZONE: LEG
LOCATION ZONE: TRUNK

## 2025-08-08 ENCOUNTER — APPOINTMENT (OUTPATIENT)
Age: 40
Setting detail: DERMATOLOGY
End: 2025-08-08

## 2025-08-08 DIAGNOSIS — L82.0 INFLAMED SEBORRHEIC KERATOSIS: ICD-10-CM

## 2025-08-08 PROCEDURE — 17110 DESTRUCT B9 LESION 1-14: CPT

## 2025-08-08 PROCEDURE — OTHER COUNSELING: OTHER

## 2025-08-08 PROCEDURE — OTHER LIQUID NITROGEN: OTHER

## 2025-08-08 ASSESSMENT — LOCATION SIMPLE DESCRIPTION DERM
LOCATION SIMPLE: RIGHT SHOULDER
LOCATION SIMPLE: UPPER BACK

## 2025-08-08 ASSESSMENT — LOCATION DETAILED DESCRIPTION DERM
LOCATION DETAILED: RIGHT POSTERIOR SHOULDER
LOCATION DETAILED: INFERIOR THORACIC SPINE

## 2025-08-08 ASSESSMENT — LOCATION ZONE DERM
LOCATION ZONE: ARM
LOCATION ZONE: TRUNK

## 2025-08-31 ENCOUNTER — HOSPITAL ENCOUNTER (EMERGENCY)
Facility: HOSPITAL | Age: 40
Discharge: HOME | End: 2025-08-31
Attending: EMERGENCY MEDICINE | Admitting: EMERGENCY MEDICINE
Payer: COMMERCIAL

## 2025-08-31 ENCOUNTER — APPOINTMENT (EMERGENCY)
Dept: RADIOLOGY | Facility: HOSPITAL | Age: 40
End: 2025-08-31
Payer: COMMERCIAL

## 2025-08-31 VITALS
SYSTOLIC BLOOD PRESSURE: 100 MMHG | BODY MASS INDEX: 19.99 KG/M2 | WEIGHT: 120 LBS | HEART RATE: 58 BPM | TEMPERATURE: 97.9 F | RESPIRATION RATE: 18 BRPM | OXYGEN SATURATION: 100 % | HEIGHT: 65 IN | DIASTOLIC BLOOD PRESSURE: 65 MMHG

## 2025-08-31 DIAGNOSIS — R55 VASOVAGAL EPISODE: ICD-10-CM

## 2025-08-31 DIAGNOSIS — J01.00 ACUTE MAXILLARY SINUSITIS, RECURRENCE NOT SPECIFIED: Primary | ICD-10-CM

## 2025-08-31 DIAGNOSIS — R51.9 ACUTE NONINTRACTABLE HEADACHE, UNSPECIFIED HEADACHE TYPE: ICD-10-CM

## 2025-08-31 LAB
ALBUMIN SERPL-MCNC: 4 G/DL (ref 3.5–5.7)
ALP SERPL-CCNC: 30 IU/L (ref 34–125)
ALT SERPL-CCNC: 8 IU/L (ref 7–52)
ANION GAP SERPL CALC-SCNC: 6 MEQ/L (ref 3–15)
AST SERPL-CCNC: 13 IU/L (ref 13–39)
BASOPHILS # BLD: 0.06 K/UL (ref 0.01–0.1)
BASOPHILS NFR BLD: 0.8 %
BILIRUB SERPL-MCNC: 0.4 MG/DL (ref 0.3–1.2)
BUN SERPL-MCNC: 12 MG/DL (ref 7–25)
CALCIUM SERPL-MCNC: 9 MG/DL (ref 8.6–10.3)
CHLORIDE SERPL-SCNC: 105 MEQ/L (ref 98–107)
CO2 SERPL-SCNC: 26 MEQ/L (ref 21–31)
CREAT SERPL-MCNC: 0.8 MG/DL (ref 0.6–1.2)
DIFFERENTIAL METHOD BLD: NORMAL
EGFRCR SERPLBLD CKD-EPI 2021: >60 ML/MIN/1.73M*2
EOSINOPHIL # BLD: 0.13 K/UL (ref 0.04–0.36)
EOSINOPHIL NFR BLD: 1.7 %
ERYTHROCYTE [DISTWIDTH] IN BLOOD BY AUTOMATED COUNT: 11.7 % (ref 11.7–14.4)
GLUCOSE SERPL-MCNC: 93 MG/DL (ref 70–99)
HCG UR QL: NEGATIVE
HCT VFR BLD AUTO: 38.5 % (ref 35–45)
HGB BLD-MCNC: 12.8 G/DL (ref 11.8–15.7)
IMM GRANULOCYTES # BLD AUTO: 0.02 K/UL (ref 0–0.08)
IMM GRANULOCYTES NFR BLD AUTO: 0.3 %
LYMPHOCYTES # BLD: 1.5 K/UL (ref 1.2–3.5)
LYMPHOCYTES NFR BLD: 19.2 %
MCH RBC QN AUTO: 29.6 PG (ref 28–33.2)
MCHC RBC AUTO-ENTMCNC: 33.2 G/DL (ref 32.2–35.5)
MCV RBC AUTO: 89.1 FL (ref 83–98)
MONOCYTES # BLD: 0.44 K/UL (ref 0.28–0.8)
MONOCYTES NFR BLD: 5.6 %
NEUTROPHILS # BLD: 5.65 K/UL (ref 1.7–7)
NEUTS SEG NFR BLD: 72.4 %
NRBC BLD-RTO: 0 %
PLATELET # BLD AUTO: 321 K/UL (ref 150–369)
PMV BLD AUTO: 9.4 FL (ref 9.4–12.3)
POTASSIUM SERPL-SCNC: 3.8 MEQ/L (ref 3.5–5.1)
PROT SERPL-MCNC: 6.7 G/DL (ref 6–8.2)
RBC # BLD AUTO: 4.32 M/UL (ref 3.93–5.22)
SODIUM SERPL-SCNC: 137 MEQ/L (ref 136–145)
WBC # BLD AUTO: 7.8 K/UL (ref 3.8–10.5)

## 2025-08-31 PROCEDURE — 85025 COMPLETE CBC W/AUTO DIFF WBC: CPT

## 2025-08-31 PROCEDURE — 99284 EMERGENCY DEPT VISIT MOD MDM: CPT | Mod: 25

## 2025-08-31 PROCEDURE — 63700000 HC SELF-ADMINISTRABLE DRUG: Performed by: PHYSICIAN ASSISTANT

## 2025-08-31 PROCEDURE — 84703 CHORIONIC GONADOTROPIN ASSAY: CPT

## 2025-08-31 PROCEDURE — 36415 COLL VENOUS BLD VENIPUNCTURE: CPT

## 2025-08-31 PROCEDURE — 96374 THER/PROPH/DIAG INJ IV PUSH: CPT

## 2025-08-31 PROCEDURE — 96361 HYDRATE IV INFUSION ADD-ON: CPT

## 2025-08-31 PROCEDURE — 96375 TX/PRO/DX INJ NEW DRUG ADDON: CPT

## 2025-08-31 PROCEDURE — 80053 COMPREHEN METABOLIC PANEL: CPT | Performed by: EMERGENCY MEDICINE

## 2025-08-31 PROCEDURE — 3E0337Z INTRODUCTION OF ELECTROLYTIC AND WATER BALANCE SUBSTANCE INTO PERIPHERAL VEIN, PERCUTANEOUS APPROACH: ICD-10-PCS | Performed by: EMERGENCY MEDICINE

## 2025-08-31 PROCEDURE — 85025 COMPLETE CBC W/AUTO DIFF WBC: CPT | Performed by: EMERGENCY MEDICINE

## 2025-08-31 PROCEDURE — 93005 ELECTROCARDIOGRAM TRACING: CPT | Performed by: PHYSICIAN ASSISTANT

## 2025-08-31 PROCEDURE — 3E033GC INTRODUCTION OF OTHER THERAPEUTIC SUBSTANCE INTO PERIPHERAL VEIN, PERCUTANEOUS APPROACH: ICD-10-PCS | Performed by: EMERGENCY MEDICINE

## 2025-08-31 PROCEDURE — 25800000 HC PHARMACY IV SOLUTIONS: Performed by: PHYSICIAN ASSISTANT

## 2025-08-31 PROCEDURE — 63600000 HC DRUGS/DETAIL CODE: Mod: JZ | Performed by: PHYSICIAN ASSISTANT

## 2025-08-31 PROCEDURE — 70450 CT HEAD/BRAIN W/O DYE: CPT

## 2025-08-31 PROCEDURE — 84703 CHORIONIC GONADOTROPIN ASSAY: CPT | Performed by: EMERGENCY MEDICINE

## 2025-08-31 RX ORDER — AMOXICILLIN AND CLAVULANATE POTASSIUM 875; 125 MG/1; MG/1
1 TABLET, FILM COATED ORAL
Qty: 20 TABLET | Refills: 0 | Status: SHIPPED | OUTPATIENT
Start: 2025-08-31 | End: 2025-09-10

## 2025-08-31 RX ORDER — METOCLOPRAMIDE HYDROCHLORIDE 5 MG/ML
10 INJECTION INTRAMUSCULAR; INTRAVENOUS ONCE
Status: COMPLETED | OUTPATIENT
Start: 2025-08-31 | End: 2025-08-31

## 2025-08-31 RX ORDER — DROSPIRENONE AND ETHINYL ESTRADIOL 0.02-3(28)
1 KIT ORAL
COMMUNITY
Start: 2024-11-12

## 2025-08-31 RX ORDER — DIPHENHYDRAMINE HCL 50 MG/ML
25 VIAL (ML) INJECTION ONCE
Status: COMPLETED | OUTPATIENT
Start: 2025-08-31 | End: 2025-08-31

## 2025-08-31 RX ORDER — AMOXICILLIN AND CLAVULANATE POTASSIUM 875; 125 MG/1; MG/1
1 TABLET, FILM COATED ORAL ONCE
Status: COMPLETED | OUTPATIENT
Start: 2025-08-31 | End: 2025-08-31

## 2025-08-31 RX ADMIN — SODIUM CHLORIDE 500 ML: 9 INJECTION, SOLUTION INTRAVENOUS at 11:35

## 2025-08-31 RX ADMIN — AMOXICILLIN AND CLAVULANATE POTASSIUM 1 TABLET: 875; 125 TABLET, FILM COATED ORAL at 11:34

## 2025-08-31 RX ADMIN — DIPHENHYDRAMINE HYDROCHLORIDE 25 MG: 50 INJECTION INTRAMUSCULAR; INTRAVENOUS at 10:52

## 2025-08-31 RX ADMIN — METOCLOPRAMIDE 10 MG: 5 INJECTION, SOLUTION INTRAMUSCULAR; INTRAVENOUS at 10:50

## 2025-08-31 RX ADMIN — SODIUM CHLORIDE 1000 ML: 9 INJECTION, SOLUTION INTRAVENOUS at 10:52

## 2025-08-31 ASSESSMENT — ENCOUNTER SYMPTOMS: CHILLS: 0

## 2025-09-01 LAB
ATRIAL RATE: 62
P AXIS: 82
PR INTERVAL: 144
QRS DURATION: 78
QT INTERVAL: 452
QTC CALCULATION(BAZETT): 458
R AXIS: 79
T WAVE AXIS: 52
VENTRICULAR RATE: 62